# Patient Record
Sex: FEMALE | Race: WHITE | NOT HISPANIC OR LATINO | Employment: UNEMPLOYED | URBAN - NONMETROPOLITAN AREA
[De-identification: names, ages, dates, MRNs, and addresses within clinical notes are randomized per-mention and may not be internally consistent; named-entity substitution may affect disease eponyms.]

---

## 2022-09-16 ENCOUNTER — INITIAL PRENATAL (OUTPATIENT)
Dept: OBSTETRICS AND GYNECOLOGY | Facility: CLINIC | Age: 22
End: 2022-09-16

## 2022-09-16 VITALS — SYSTOLIC BLOOD PRESSURE: 102 MMHG | BODY MASS INDEX: 29.13 KG/M2 | DIASTOLIC BLOOD PRESSURE: 60 MMHG | WEIGHT: 186 LBS

## 2022-09-16 VITALS
DIASTOLIC BLOOD PRESSURE: 60 MMHG | WEIGHT: 186.6 LBS | SYSTOLIC BLOOD PRESSURE: 102 MMHG | BODY MASS INDEX: 29.29 KG/M2 | HEIGHT: 67 IN

## 2022-09-16 DIAGNOSIS — Z34.02 PRIMIGRAVIDA IN SECOND TRIMESTER: ICD-10-CM

## 2022-09-16 DIAGNOSIS — O35.9XX0 SUSPECTED FETAL ANOMALY, ANTEPARTUM, SINGLE OR UNSPECIFIED FETUS: ICD-10-CM

## 2022-09-16 DIAGNOSIS — Z34.02 ENCOUNTER FOR SUPERVISION OF NORMAL FIRST PREGNANCY IN SECOND TRIMESTER: Primary | ICD-10-CM

## 2022-09-16 DIAGNOSIS — N89.8 VAGINAL DISCHARGE DURING PREGNANCY IN SECOND TRIMESTER: ICD-10-CM

## 2022-09-16 DIAGNOSIS — Z36.9 ANTENATAL SCREENING ENCOUNTER: ICD-10-CM

## 2022-09-16 DIAGNOSIS — Z3A.23 23 WEEKS GESTATION OF PREGNANCY: ICD-10-CM

## 2022-09-16 DIAGNOSIS — Z36.3 ENCOUNTER FOR ANTENATAL SCREENING FOR MALFORMATION USING ULTRASOUND: Primary | ICD-10-CM

## 2022-09-16 DIAGNOSIS — Z36.2 ENCOUNTER FOR OTHER ANTENATAL SCREENING FOLLOW-UP: ICD-10-CM

## 2022-09-16 DIAGNOSIS — O26.892 VAGINAL DISCHARGE DURING PREGNANCY IN SECOND TRIMESTER: ICD-10-CM

## 2022-09-16 DIAGNOSIS — F12.91 HISTORY OF MARIJUANA USE: ICD-10-CM

## 2022-09-16 LAB
AMPHET+METHAMPHET UR QL: NEGATIVE
AMPHETAMINES UR QL: NEGATIVE
BARBITURATES UR QL SCN: NEGATIVE
BENZODIAZ UR QL SCN: NEGATIVE
BUPRENORPHINE SERPL-MCNC: NEGATIVE NG/ML
CANDIDA ALBICANS: POSITIVE
CANNABINOIDS SERPL QL: NEGATIVE
COCAINE UR QL: NEGATIVE
GARDNERELLA VAGINALIS: NEGATIVE
METHADONE UR QL SCN: NEGATIVE
OPIATES UR QL: NEGATIVE
OXYCODONE UR QL SCN: NEGATIVE
PCP UR QL SCN: NEGATIVE
PROPOXYPH UR QL: NEGATIVE
T VAGINALIS DNA VAG QL PROBE+SIG AMP: NEGATIVE
TRICYCLICS UR QL SCN: NEGATIVE

## 2022-09-16 PROCEDURE — 87480 CANDIDA DNA DIR PROBE: CPT | Performed by: NURSE PRACTITIONER

## 2022-09-16 PROCEDURE — 99204 OFFICE O/P NEW MOD 45 MIN: CPT | Performed by: NURSE PRACTITIONER

## 2022-09-16 PROCEDURE — 87510 GARDNER VAG DNA DIR PROBE: CPT | Performed by: NURSE PRACTITIONER

## 2022-09-16 PROCEDURE — 87660 TRICHOMONAS VAGIN DIR PROBE: CPT | Performed by: NURSE PRACTITIONER

## 2022-09-16 PROCEDURE — 80306 DRUG TEST PRSMV INSTRMNT: CPT | Performed by: NURSE PRACTITIONER

## 2022-09-16 NOTE — PROGRESS NOTES
I spent approximately 60 minutes with the patient acquiring the health and history intake, reviewing prior records, discussing topics related to healthy pregnancy, entering orders, and documentation. She is transferring from [facility]. We have her records from that office. She has had her ultrasounds and labs done. Her EDC is 1/10/23. This is her 1st pregnancy and she is having a girl.   The patient tells me that she has a lot of headaches and has history of migraines. Her previous OB provider tried her on a couple of different medicines, but they did not help. She complains today about a vaginal discharge; a vaginosis panel will be ordered.   A newob bag is given. We discussed a healthy diet and exercise and what is recommended. I told her to try to eat small frequent healthy meals and stay well hydrated.  I also discussed Listeriosis and Toxoplasmosis and what fish to avoid due to high mercury levels. I informed patient not to be in hot tubs, saunas, or tanning beds. We discussed that spotting may occur after intercourse which is common, but if heavy bleeding like a period occurs to call the [facility] or hospital if clinic is closed.  I encouraged her to make an appointment with the dentist if she has not had a dental exam and cleaning in the last 6 months.  I instructed the patient that alcohol, illicit drug use, and tobacco smoking are not recommended in pregnancy. She is trying to quit vaping. She says she stopped smoking marijuana, but she has used CBD oil a couple of times because it helps with her headaches. I told her that we do not recommend that in pregnancy. I also encouraged her not to be around any secondhand smoke either.  She plans to breastfeed.  She filled out the depression screening questionnaire and scored 6. I talked to her about education classes. I told her that we are doing in-person classes now. I gave her a schedule of the upcoming classes. I encouraged the patient to get the TDAP  vaccine in the 3rd trimester.  I discussed with the patient that a pediatrician needs to be chosen prior to delivery for the infant to have an appointment scheduled before leaving the hospital. I discussed with the patient that she will have a 1 hr glucola around 26-28 weeks gestation. A UDS is done today.  All questions were answered at this time. She is scheduled to see Marlen CUELLAR.

## 2022-09-16 NOTE — PROGRESS NOTES
[facility]  Obstetrics  Date of Service: 2022    CHIEF COMPLAINT:  New prenatal visit    HISTORY OF PRESENT ILLNESS:  Teresa Catalan is a 22 y.o. y/o  at 23w3d by LMP (Patient's last menstrual period was 2022.).  This was an unplanned pregnancy and the patient is supported by her boyfriend.  Reports no nausea without vomiting.  Reports breast tenderness.  She denies any vaginal bleeding.  She has started taking a prenatal vitamin.    REVIEW OF SYSTEMS  Review of Systems   Constitutional: Negative for activity change, appetite change, diaphoresis, fatigue, unexpected weight gain and unexpected weight loss.   Respiratory: Negative for chest tightness and shortness of breath.    Cardiovascular: Negative for chest pain and palpitations.   Gastrointestinal: Negative for abdominal distention, abdominal pain, constipation, diarrhea, nausea and vomiting.   Genitourinary: Negative for breast discharge, breast lump, breast pain, decreased libido, dyspareunia, dysuria, pelvic pain, vaginal bleeding, vaginal discharge and vaginal pain.   Musculoskeletal: Negative for myalgias.   Skin: Negative for color change, dry skin and skin lesions.   Neurological: Negative for light-headedness and headache.   Psychiatric/Behavioral: Negative for agitation, dysphoric mood, sleep disturbance, depressed mood and stress. The patient is not nervous/anxious.        PRENATAL RISK FACTORS   Problems (from 22 to present)     Problem Noted Resolved    Primigravida in second trimester 2022 by Marlen Benedict APRN No          DATING CRITERIA:  LMP (22) -- BELINDA 23  1TUS (22 at 12w0d) -- BELINDA 1/10/23 FINAL    OBSTETRIC HISTORY:  OB History    Para Term  AB Living   1             SAB IAB Ectopic Molar Multiple Live Births                    # Outcome Date GA Lbr Eleazar/2nd Weight Sex Delivery Anes PTL Lv   1 Current              GYN HISTORY:  Denies h/o sexually  transmitted infections/pelvic inflammatory disease  Denies h/o abnormal pap smears  Last pap smear:   Last Completed Pap Smear     This patient has no relevant Health Maintenance data.        Denies h/o gynecologic surgeries, including biopsies of the cervix    PAST MEDICAL HISTORY:  Past Medical History:   Diagnosis Date   • Anxiety    • Chlamydia    • Depression    • GERD (gastroesophageal reflux disease)    • Hemorrhoids    • Migraine    • Substance abuse (HCC)      PAST SURGICAL HISTORY:  Past Surgical History:   Procedure Laterality Date   • EAR TUBES     • SHOULDER SURGERY     • WISDOM TOOTH EXTRACTION       FAMILY HISTORY:  Family History   Problem Relation Age of Onset   • Migraines Mother    • Hypertension Father    • No Known Problems Brother    • COPD Maternal Grandmother    • Skin cancer Maternal Grandfather    • Stomach cancer Paternal Grandmother    • Heart disease Paternal Grandfather      SOCIAL HISTORY:  Social History     Socioeconomic History   • Marital status: Single   Tobacco Use   • Smoking status: Never Smoker   • Smokeless tobacco: Never Used   Vaping Use   • Vaping Use: Every day   Substance and Sexual Activity   • Alcohol use: Never   • Drug use: Not Currently     Types: Marijuana   • Sexual activity: Yes     Partners: Male     GENETIC SCREENING:  Age >36 yo as of BELINDA: no  Thalassemia: no  NTD: no  CHD: no  Down Syndrome/MR/Fragile X/Autism: no  Ashkenazi Mandaeism with Narendra-Sachs, [place]avan, familial dysautonomia: no  Sickle cell disease or trait: no  Hemophilia: no  Muscular dystrophy: no  Cystic fibrosis: no  Bettye's chorea: no  Birth defects: no  Genetic/chromosomal disorders: no    INFECTION HISTORY:  TB exposure: no  HSV: no  Illness since LMP: no  Prior GBS infected child: no  STIs: chlamydia in first trimester, ÓSCAR negative on 8/9    ALLERGIES:  No Known Allergies    MEDICATIONS:  Prior to Admission medications    Medication Sig Start Date End Date Taking? Authorizing Provider    PRENATAL GUMMY VITAMIN Chew 1 each Daily.   Yes Provider, MD Arpit       PHYSICAL EXAM:   /60   Wt 84.4 kg (186 lb)   LMP 2022   BMI 29.13 kg/m²   General: Alert, healthy, no distress, well nourished and well developed.  Neurologic: Alert, oriented to person, place, and time.  Gait normal.  Cranial nerves II-XII grossly intact.  HEENT: Normocephalic, atraumatic.  Extraocular muscles intact, pupils equal and reactive x2.    Teeth: Normal hygiene.  Neck: Supple, no adenopathy, thyroid normal size, non-tender, without nodularity, trachea midline.  Deferred  Lungs: Normal respiratory effort.  Clear to auscultation bilaterally.  No wheezes, rhonci, or rales.  Heart: Regular rate and rhythm.  No murmer, rub or gallop.  Abdomen: Gravid, 23cm.   Skin: No rash, no lesions.  Extremities: No cyanosis, clubbing or edema.  PELVIC EXAM: Deferred    IMPRESSION:  Teresa Catalan is a 22 y.o.  at 23w3d for a new prenatal visit.    PLAN:  1.  IUP at 23w3d  - Options counseling performed and patient desires continuation of pregnancy to term   - Prenatal labs ordered  - Genetic testing, including cystic fibrosis, was discussed and patient had drawn; Panorama low risk, having a Girl  - Continue prenatal vitamins  - Weight gain counseling performed.   - Pregravid BMI 25-29.9: Recommend 15-25 lb  - Return to clinic in 4 weeks for return prenatal visit  - Reviewed medical records from [facility]. Anatomy scan at 2 weeks had subopts of cervical spine. Will repeat to R/O fetal anomaly of that area. Scheduled with KAILA Renee     Diagnosis Plan   1. Encounter for  screening for malformation using ultrasound  US Ob 14 + Weeks Single or First Gestation   2. Encounter for other  screening follow-up  US Ob 14 + Weeks Single or First Gestation   3. Suspected fetal anomaly, antepartum, single or unspecified fetus  US Ob 14 + Weeks Single or First Gestation   4. Primigravida in second trimester     5. 23  weeks gestation of pregnancy       Marlen Benedict, APRN  9/16/2022  10:29 CDT

## 2022-09-16 NOTE — PROGRESS NOTES
CC: Prenatal visit    Teresa Catalan is a 22 y.o.  at 23w3d.  Doing well.  Denies contractions, LOF, or VB.  Reports good FM.    LMP 2022   SVE: ***           Problems (from 22 to present)     No problems associated with this episode.          A/P: Teresa Catalan is a 22 y.o.  at 23w3d.  - RTC in *** weeks  - Reviewed COVID-19 visitation policy  - Reviewed COVID-19 precautions     Diagnosis Plan   1. Encounter for  screening for malformation using ultrasound  US Ob 14 + Weeks Single or First Gestation   2. Encounter for other  screening follow-up  US Ob 14 + Weeks Single or First Gestation   3. Suspected fetal anomaly, antepartum, single or unspecified fetus  US Ob 14 + Weeks Single or First Gestation     Marlen Benedict, POLO  2022  10:26 CDT

## 2022-09-26 ENCOUNTER — REFERRAL TRIAGE (OUTPATIENT)
Dept: LABOR AND DELIVERY | Facility: HOSPITAL | Age: 22
End: 2022-09-26

## 2022-09-27 ENCOUNTER — PATIENT OUTREACH (OUTPATIENT)
Dept: LABOR AND DELIVERY | Facility: HOSPITAL | Age: 22
End: 2022-09-27

## 2022-09-27 NOTE — OUTREACH NOTE
[facility]  Enrollment    Current Estimated Gestational Age: 25w0d    Questions/Answers    Flowsheet Row Responses   Would like to participate? Yes   Date of Intake Visit 10/12/22              Ree Gamble RN  Maternity Nurse Navigator    9/27/2022, 14:10 CDT

## 2022-10-12 ENCOUNTER — ROUTINE PRENATAL (OUTPATIENT)
Dept: OBSTETRICS AND GYNECOLOGY | Facility: CLINIC | Age: 22
End: 2022-10-12

## 2022-10-12 ENCOUNTER — LAB (OUTPATIENT)
Dept: LAB | Facility: HOSPITAL | Age: 22
End: 2022-10-12

## 2022-10-12 ENCOUNTER — PATIENT OUTREACH (OUTPATIENT)
Dept: LABOR AND DELIVERY | Facility: HOSPITAL | Age: 22
End: 2022-10-12

## 2022-10-12 ENCOUNTER — TELEPHONE (OUTPATIENT)
Dept: OBSTETRICS AND GYNECOLOGY | Facility: CLINIC | Age: 22
End: 2022-10-12

## 2022-10-12 VITALS — SYSTOLIC BLOOD PRESSURE: 106 MMHG | WEIGHT: 197 LBS | BODY MASS INDEX: 30.85 KG/M2 | DIASTOLIC BLOOD PRESSURE: 62 MMHG

## 2022-10-12 DIAGNOSIS — K21.9 GASTROESOPHAGEAL REFLUX DURING PREGNANCY IN SECOND TRIMESTER, ANTEPARTUM: ICD-10-CM

## 2022-10-12 DIAGNOSIS — Z34.02 PRIMIGRAVIDA IN SECOND TRIMESTER: Primary | ICD-10-CM

## 2022-10-12 DIAGNOSIS — Z36.9 ANTENATAL SCREENING ENCOUNTER: ICD-10-CM

## 2022-10-12 DIAGNOSIS — O99.612 GASTROESOPHAGEAL REFLUX DURING PREGNANCY IN SECOND TRIMESTER, ANTEPARTUM: ICD-10-CM

## 2022-10-12 DIAGNOSIS — Z23 NEED FOR TDAP VACCINATION: ICD-10-CM

## 2022-10-12 DIAGNOSIS — Z3A.27 27 WEEKS GESTATION OF PREGNANCY: ICD-10-CM

## 2022-10-12 DIAGNOSIS — L29.2 VULVAR ITCHING: ICD-10-CM

## 2022-10-12 LAB
CANDIDA ALBICANS: NEGATIVE
DEPRECATED RDW RBC AUTO: 40.5 FL (ref 37–54)
ERYTHROCYTE [DISTWIDTH] IN BLOOD BY AUTOMATED COUNT: 12.4 % (ref 12.3–15.4)
GARDNERELLA VAGINALIS: POSITIVE
GLUCOSE 1H P 100 G GLC PO SERPL-MCNC: 115 MG/DL (ref 65–139)
HCT VFR BLD AUTO: 32.2 % (ref 34–46.6)
HGB BLD-MCNC: 10.8 G/DL (ref 12–15.9)
MCH RBC QN AUTO: 30.9 PG (ref 26.6–33)
MCHC RBC AUTO-ENTMCNC: 33.5 G/DL (ref 31.5–35.7)
MCV RBC AUTO: 92 FL (ref 79–97)
PLATELET # BLD AUTO: 330 10*3/MM3 (ref 140–450)
PMV BLD AUTO: 9.5 FL (ref 6–12)
RBC # BLD AUTO: 3.5 10*6/MM3 (ref 3.77–5.28)
T VAGINALIS DNA VAG QL PROBE+SIG AMP: NEGATIVE
WBC NRBC COR # BLD: 9.33 10*3/MM3 (ref 3.4–10.8)

## 2022-10-12 PROCEDURE — 90471 IMMUNIZATION ADMIN: CPT | Performed by: NURSE PRACTITIONER

## 2022-10-12 PROCEDURE — 87480 CANDIDA DNA DIR PROBE: CPT | Performed by: NURSE PRACTITIONER

## 2022-10-12 PROCEDURE — 90715 TDAP VACCINE 7 YRS/> IM: CPT | Performed by: NURSE PRACTITIONER

## 2022-10-12 PROCEDURE — 87660 TRICHOMONAS VAGIN DIR PROBE: CPT | Performed by: NURSE PRACTITIONER

## 2022-10-12 PROCEDURE — 87510 GARDNER VAG DNA DIR PROBE: CPT | Performed by: NURSE PRACTITIONER

## 2022-10-12 PROCEDURE — 82950 GLUCOSE TEST: CPT

## 2022-10-12 PROCEDURE — 0502F SUBSEQUENT PRENATAL CARE: CPT | Performed by: NURSE PRACTITIONER

## 2022-10-12 PROCEDURE — 85027 COMPLETE CBC AUTOMATED: CPT

## 2022-10-12 RX ORDER — OMEPRAZOLE 40 MG/1
40 CAPSULE, DELAYED RELEASE ORAL DAILY
Qty: 30 CAPSULE | Refills: 6 | Status: ON HOLD | OUTPATIENT
Start: 2022-10-12 | End: 2022-12-17

## 2022-10-12 NOTE — TELEPHONE ENCOUNTER
After visit today patient called back with questions. Patient states after having her ultrasound today, she noticed her LMP was wrong. Patient transferred care from [facility]. I told patient I would check those records and give her a call. All records from [facility] state LMP was 4/30/22. Patient says her LMP was 3/30/22. Not sure if there was a typo. Per Marlen Benedict, patients LMP was updated in the computer to reflect the correct LMP. I called the patient back and made her aware.

## 2022-10-12 NOTE — OUTREACH NOTE
Spoke to Teresa and she is at her doctor's office finishing up an appointment.  Will call her back around 1100 to complete her intake call.

## 2022-10-12 NOTE — PROGRESS NOTES
CC: Prenatal visit    Teresa Catalan is a 22 y.o.  at 27w1d.  Doing well.  Denies dysuria, headaches, constipation, cramping, regular   contractions, LOF, or VB.  Reports good FM. Has vulvar itching but denies vaginal d/c. Has c/o reflux and Tums is not enough.    /62   Wt 89.4 kg (197 lb)   LMP 2022   BMI 30.85 kg/m²   SVE: NA  Pt self-collected a vag panel     Fetal Heart Rate: 139     Problems (from 22 to present)     Problem Noted Resolved    Gastroesophageal reflux during pregnancy in second trimester, antepartum 10/12/2022 by Marlen Benedict APRN No    Primigravida in second trimester 2022 by Marlen Benedict APRN No          A/P: Teresa Catalan is a 22 y.o.  at 27w1d.  - RTC in 3 weeks  - Reviewed COVID-19 visitation policy  - Reviewed COVID-19 precautions     Diagnosis Plan   1. Primigravida in second trimester        2. Vulvar itching  Gardnerella vaginalis, Trichomonas vaginalis, Candida albicans, DNA - Swab, Vagina      3. Gastroesophageal reflux during pregnancy in second trimester, antepartum  Start on omeprazole daily. RBA and potential s/e reviewed.       4. Need for Tdap vaccination  Tdap Vaccine Greater Than or Equal To 6yo IM      5. 27 weeks gestation of pregnancy          POLO Purdy  10/12/2022  11:46 CDT

## 2022-10-12 NOTE — OUTREACH NOTE
[facility]  Intake    Current Estimated Gestational Age: 27w1d    Questions/Answers    Flowsheet Row Responses   Best Method for Contacting Cell   Do you have a dentist? Yes   Dentist Name Dr. Velásquez   Have you seen a dentist in the last 6 months Yes   Next Appointment: --  [November]   Resources Presently Utilizing: WIC (Women, Infant, Children)   Maternal Warning Signs Provided          [facility]  Check-In    Current Estimated Gestational Age: 27w1d    Questions/Answers    Flowsheet Row Responses   Best Method for Contacting Cell   Demographics Reviewed Yes   Currently Employed Yes   Able to keep appointments as scheduled Yes   Gender(s) and Name(s) Girl- Juliano Manning   Baby Active/Feeling Fetal Movemen Yes   How are you presently feeling? doing well   Questions regarding prenatal visits or tests to be ordered? No   Education related to new diagnoses/home equipment No   May I ask you questions about your substance use? Yes   Resource/Environmental Concerns None   Do you have any questions related to your care experience, your pregnancy, plans for delivery, any concerns, etc? No          Patient is interested in a unit tour.  She will check with s/o about availability and we will schedule this at our next check in.    Ree Gamble, RN  Maternity Nurse Navigator    10/12/2022, 11:35 CDT          Ree Gamble RN  Maternity Nurse Navigator    10/12/2022, 11:35 CDT

## 2022-10-13 DIAGNOSIS — Z36.3 ENCOUNTER FOR ANTENATAL SCREENING FOR MALFORMATION USING ULTRASOUND: ICD-10-CM

## 2022-10-13 DIAGNOSIS — Z36.2 ENCOUNTER FOR OTHER ANTENATAL SCREENING FOLLOW-UP: ICD-10-CM

## 2022-10-13 DIAGNOSIS — O35.9XX0 SUSPECTED FETAL ANOMALY, ANTEPARTUM, SINGLE OR UNSPECIFIED FETUS: ICD-10-CM

## 2022-10-13 RX ORDER — METRONIDAZOLE 500 MG/1
500 TABLET ORAL 2 TIMES DAILY
Qty: 14 TABLET | Refills: 0 | Status: SHIPPED | OUTPATIENT
Start: 2022-10-13 | End: 2022-10-20

## 2022-11-02 ENCOUNTER — ROUTINE PRENATAL (OUTPATIENT)
Dept: OBSTETRICS AND GYNECOLOGY | Facility: CLINIC | Age: 22
End: 2022-11-02

## 2022-11-02 VITALS — WEIGHT: 204 LBS | DIASTOLIC BLOOD PRESSURE: 70 MMHG | BODY MASS INDEX: 31.95 KG/M2 | SYSTOLIC BLOOD PRESSURE: 108 MMHG

## 2022-11-02 DIAGNOSIS — Z34.03 PRIMIGRAVIDA IN THIRD TRIMESTER: Primary | ICD-10-CM

## 2022-11-02 DIAGNOSIS — Z3A.31 31 WEEKS GESTATION OF PREGNANCY: ICD-10-CM

## 2022-11-02 DIAGNOSIS — O99.612 GASTROESOPHAGEAL REFLUX DURING PREGNANCY IN SECOND TRIMESTER, ANTEPARTUM: ICD-10-CM

## 2022-11-02 DIAGNOSIS — M54.9 BACK PAIN AFFECTING PREGNANCY IN THIRD TRIMESTER: ICD-10-CM

## 2022-11-02 DIAGNOSIS — O99.891 BACK PAIN AFFECTING PREGNANCY IN THIRD TRIMESTER: ICD-10-CM

## 2022-11-02 DIAGNOSIS — K21.9 GASTROESOPHAGEAL REFLUX DURING PREGNANCY IN SECOND TRIMESTER, ANTEPARTUM: ICD-10-CM

## 2022-11-02 PROCEDURE — 0502F SUBSEQUENT PRENATAL CARE: CPT | Performed by: NURSE PRACTITIONER

## 2022-11-02 NOTE — PROGRESS NOTES
CC: Prenatal visit    Teresa Catalan is a 22 y.o.  at 31w0d.  Doing well.  Denies N/V, dysuria, abnormal vaginal d/c, headaches, heartburn, constipation, cramping, regular contractions, LOF, or VB.  Reports good FM. Having frequent low back pain. Has not tried anything OTC or comfort measures.    /70   Wt 92.5 kg (204 lb)   LMP 2022 (Exact Date)   BMI 31.95 kg/m²   SVE: NA  Fundal Height (cm): 31 cm  Fetal Heart Rate: 136     Problems (from 22 to present)     Problem Noted Resolved    Gastroesophageal reflux during pregnancy in second trimester, antepartum 10/12/2022 by Marlen Benedict APRN No    Primigravida in third trimester 2022 by Marlen Benedict APRN No          A/P: Teresa Catalan is a 22 y.o.  at 31w0d.  - RTC in 2 weeks  - Reviewed COVID-19 visitation policy  - Reviewed COVID-19 precautions     Diagnosis Plan   1. Primigravida in third trimester        2. Gastroesophageal reflux during pregnancy in second trimester, antepartum        3. 31 weeks gestation of pregnancy        4. Back pain affecting pregnancy in third trimester  Ambulatory Referral to Physical Therapy Evaluate and treat, Pelvic Floor    Comfort measures reviewed.         POLO Purdy  2022  12:54 CDT

## 2022-11-04 PROBLEM — Z34.03 PRIMIGRAVIDA IN THIRD TRIMESTER: Status: ACTIVE | Noted: 2022-09-16

## 2022-11-08 ENCOUNTER — PATIENT OUTREACH (OUTPATIENT)
Dept: LABOR AND DELIVERY | Facility: HOSPITAL | Age: 22
End: 2022-11-08

## 2022-11-08 NOTE — OUTREACH NOTE
[facility]  Unable to Reach       Questions/Answers    Flowsheet Row Responses   Pending Outreach Prenatal Check-in   Call Attempt First   Outcome No answer/busy, MyChart message sent to patient   Unable to reach comments: will contact again at 35-36 weeks              Ree Gamble RN  Maternity Nurse Navigator    11/8/2022, 14:18 CST

## 2022-11-21 ENCOUNTER — ROUTINE PRENATAL (OUTPATIENT)
Dept: OBSTETRICS AND GYNECOLOGY | Facility: CLINIC | Age: 22
End: 2022-11-21

## 2022-11-21 VITALS — WEIGHT: 214 LBS | BODY MASS INDEX: 33.52 KG/M2 | DIASTOLIC BLOOD PRESSURE: 78 MMHG | SYSTOLIC BLOOD PRESSURE: 118 MMHG

## 2022-11-21 DIAGNOSIS — Z34.03 PRIMIGRAVIDA IN THIRD TRIMESTER: Primary | ICD-10-CM

## 2022-11-21 DIAGNOSIS — K21.9 GASTROESOPHAGEAL REFLUX DURING PREGNANCY IN SECOND TRIMESTER, ANTEPARTUM: ICD-10-CM

## 2022-11-21 DIAGNOSIS — Z3A.33 33 WEEKS GESTATION OF PREGNANCY: ICD-10-CM

## 2022-11-21 DIAGNOSIS — O99.612 GASTROESOPHAGEAL REFLUX DURING PREGNANCY IN SECOND TRIMESTER, ANTEPARTUM: ICD-10-CM

## 2022-11-21 PROCEDURE — 0502F SUBSEQUENT PRENATAL CARE: CPT | Performed by: NURSE PRACTITIONER

## 2022-12-01 ENCOUNTER — ROUTINE PRENATAL (OUTPATIENT)
Dept: OBSTETRICS AND GYNECOLOGY | Facility: CLINIC | Age: 22
End: 2022-12-01

## 2022-12-01 VITALS — BODY MASS INDEX: 34.21 KG/M2 | SYSTOLIC BLOOD PRESSURE: 112 MMHG | WEIGHT: 218.4 LBS | DIASTOLIC BLOOD PRESSURE: 72 MMHG

## 2022-12-01 DIAGNOSIS — O99.612 GASTROESOPHAGEAL REFLUX DURING PREGNANCY IN SECOND TRIMESTER, ANTEPARTUM: ICD-10-CM

## 2022-12-01 DIAGNOSIS — Z3A.35 35 WEEKS GESTATION OF PREGNANCY: ICD-10-CM

## 2022-12-01 DIAGNOSIS — K21.9 GASTROESOPHAGEAL REFLUX DURING PREGNANCY IN SECOND TRIMESTER, ANTEPARTUM: ICD-10-CM

## 2022-12-01 DIAGNOSIS — Z34.03 PRIMIGRAVIDA IN THIRD TRIMESTER: Primary | ICD-10-CM

## 2022-12-01 PROCEDURE — 59425 ANTEPARTUM CARE ONLY: CPT | Performed by: OBSTETRICS & GYNECOLOGY

## 2022-12-01 NOTE — PROGRESS NOTES
CC: Prenatal visit    Teresa Catalan is a 22 y.o.  at 35w1d.  Doing well.  Denies contractions, LOF, or VB.  Reports good FM.    /72   Wt 99.1 kg (218 lb 6.4 oz)   LMP 2022 (Exact Date)   BMI 34.21 kg/m²    BSUS: cephalic     Fetal Heart Rate: 148     Problems (from 22 to present)     Problem Noted Resolved    Gastroesophageal reflux during pregnancy in second trimester, antepartum 10/12/2022 by Marlen Benedict APRN No    Primigravida in third trimester 2022 by Marlen Benedict, POLO No        A/P: Teresa Catalan is a 22 y.o.  at 35w1d.  - RTC in 1 week w/ GBS  - Hopes for spontaneous labor, considering an epidural  - Reviewed COVID-19 visitation policy  - Reviewed COVID-19 precautions     Diagnosis Plan   1. Primigravida in third trimester        2. Gastroesophageal reflux during pregnancy in second trimester, antepartum        3. 35 weeks gestation of pregnancy          Ramonita Delong MD  2022  15:13 CST

## 2022-12-08 ENCOUNTER — ROUTINE PRENATAL (OUTPATIENT)
Dept: OBSTETRICS AND GYNECOLOGY | Facility: CLINIC | Age: 22
End: 2022-12-08

## 2022-12-08 VITALS — DIASTOLIC BLOOD PRESSURE: 70 MMHG | SYSTOLIC BLOOD PRESSURE: 116 MMHG | WEIGHT: 218 LBS | BODY MASS INDEX: 34.14 KG/M2

## 2022-12-08 DIAGNOSIS — O98.819 CHLAMYDIA INFECTION DURING PREGNANCY: ICD-10-CM

## 2022-12-08 DIAGNOSIS — Z36.89 ENCOUNTER FOR OTHER SPECIFIED ANTENATAL SCREENING: ICD-10-CM

## 2022-12-08 DIAGNOSIS — K21.9 GASTROESOPHAGEAL REFLUX DURING PREGNANCY IN SECOND TRIMESTER, ANTEPARTUM: ICD-10-CM

## 2022-12-08 DIAGNOSIS — Z3A.36 36 WEEKS GESTATION OF PREGNANCY: Primary | ICD-10-CM

## 2022-12-08 DIAGNOSIS — A74.9 CHLAMYDIA INFECTION DURING PREGNANCY: ICD-10-CM

## 2022-12-08 DIAGNOSIS — Z34.03 PRIMIGRAVIDA IN THIRD TRIMESTER: ICD-10-CM

## 2022-12-08 DIAGNOSIS — O99.612 GASTROESOPHAGEAL REFLUX DURING PREGNANCY IN SECOND TRIMESTER, ANTEPARTUM: ICD-10-CM

## 2022-12-08 PROCEDURE — 0502F SUBSEQUENT PRENATAL CARE: CPT | Performed by: NURSE PRACTITIONER

## 2022-12-08 PROCEDURE — 87653 STREP B DNA AMP PROBE: CPT | Performed by: NURSE PRACTITIONER

## 2022-12-08 NOTE — PROGRESS NOTES
CC: Prenatal visit    Teresa Catalan is a 22 y.o.  at 36w1d.  Doing well.  Denies N/V, dysuria, abnormal vaginal d/c, headaches, heartburn, constipation, cramping, regular contractions, LOF, or VB.  Reports good FM.     /70   Wt 98.9 kg (218 lb)   LMP 2022 (Exact Date)   BMI 34.14 kg/m²   SVE:2/50/-3  Fundal Height (cm): 36 cm  Fetal Heart Rate: 149   Vertex via Vscan     Problems (from 22 to present)     Problem Noted Resolved    Chlamydia infection during pregnancy 2022 by Harini Castro APRN No    Overview Signed 2022  2:28 PM by Harini Castro APRN     ÓSCAR normal on 2022-in scanned records         Gastroesophageal reflux during pregnancy in second trimester, antepartum 10/12/2022 by Marlen Benedict APRN No    Overview Signed 2022 11:37 AM by Marlen Benedict APRN     omeprazole         Primigravida in third trimester 2022 by Marlen Benedict APRN No          A/P: Teresa Catalan is a 22 y.o.  at 36w1d.  - GBSs collected  - Labor and fetal kick count precautions  - RTC in 1  weeks  - Reviewed COVID-19 visitation policy  - Reviewed COVID-19 precautions     Diagnosis Plan   1. 36 weeks gestation of pregnancy        2. Primigravida in third trimester        3. Gastroesophageal reflux during pregnancy in second trimester, antepartum        4. Chlamydia infection during pregnancy  Chlamydia trachomatis, Neisseria gonorrhoeae, Trichomonas vaginalis, PCR - Urine, Cervix      5. Encounter for other specified  screening  Group B Strep (Molecular) - Swab, Vaginal/Rectum        POLO Jones  2022  14:28 CST

## 2022-12-09 ENCOUNTER — HOSPITAL ENCOUNTER (OUTPATIENT)
Facility: HOSPITAL | Age: 22
Discharge: HOME | End: 2022-12-09
Attending: STUDENT IN AN ORGANIZED HEALTH CARE EDUCATION/TRAINING PROGRAM | Admitting: STUDENT IN AN ORGANIZED HEALTH CARE EDUCATION/TRAINING PROGRAM

## 2022-12-09 VITALS
HEART RATE: 103 BPM | OXYGEN SATURATION: 94 % | DIASTOLIC BLOOD PRESSURE: 68 MMHG | RESPIRATION RATE: 16 BRPM | TEMPERATURE: 98 F | SYSTOLIC BLOOD PRESSURE: 116 MMHG

## 2022-12-09 LAB
BACTERIA UR QL AUTO: ABNORMAL /HPF
BILIRUB UR QL STRIP: NEGATIVE
CANDIDA ALBICANS: NEGATIVE
CLARITY UR: CLEAR
COLOR UR: YELLOW
GARDNERELLA VAGINALIS: NEGATIVE
GLUCOSE UR STRIP-MCNC: NEGATIVE MG/DL
HGB UR QL STRIP.AUTO: NEGATIVE
HYALINE CASTS UR QL AUTO: ABNORMAL /LPF
KETONES UR QL STRIP: ABNORMAL
LEUKOCYTE ESTERASE UR QL STRIP.AUTO: ABNORMAL
NITRITE UR QL STRIP: NEGATIVE
PH UR STRIP.AUTO: 6 [PH] (ref 5–9)
PROT UR QL STRIP: NEGATIVE
RBC # UR STRIP: ABNORMAL /HPF
REF LAB TEST METHOD: ABNORMAL
SP GR UR STRIP: 1.01 (ref 1–1.03)
SQUAMOUS #/AREA URNS HPF: ABNORMAL /HPF
T VAGINALIS DNA VAG QL PROBE+SIG AMP: NEGATIVE
UROBILINOGEN UR QL STRIP: ABNORMAL
WBC # UR STRIP: ABNORMAL /HPF

## 2022-12-09 PROCEDURE — G0463 HOSPITAL OUTPT CLINIC VISIT: HCPCS

## 2022-12-09 PROCEDURE — 87086 URINE CULTURE/COLONY COUNT: CPT | Performed by: OBSTETRICS & GYNECOLOGY

## 2022-12-09 PROCEDURE — 59025 FETAL NON-STRESS TEST: CPT

## 2022-12-09 PROCEDURE — 87480 CANDIDA DNA DIR PROBE: CPT | Performed by: OBSTETRICS & GYNECOLOGY

## 2022-12-09 PROCEDURE — 96374 THER/PROPH/DIAG INJ IV PUSH: CPT

## 2022-12-09 PROCEDURE — 87660 TRICHOMONAS VAGIN DIR PROBE: CPT | Performed by: OBSTETRICS & GYNECOLOGY

## 2022-12-09 PROCEDURE — 87510 GARDNER VAG DNA DIR PROBE: CPT | Performed by: OBSTETRICS & GYNECOLOGY

## 2022-12-09 PROCEDURE — 81001 URINALYSIS AUTO W/SCOPE: CPT | Performed by: OBSTETRICS & GYNECOLOGY

## 2022-12-09 RX ORDER — PANTOPRAZOLE SODIUM 40 MG/1
40 TABLET, DELAYED RELEASE ORAL ONCE
Status: COMPLETED | OUTPATIENT
Start: 2022-12-09 | End: 2022-12-09

## 2022-12-09 RX ORDER — SODIUM CHLORIDE 0.9 % (FLUSH) 0.9 %
10 SYRINGE (ML) INJECTION EVERY 12 HOURS SCHEDULED
Status: DISCONTINUED | OUTPATIENT
Start: 2022-12-09 | End: 2022-12-10 | Stop reason: HOSPADM

## 2022-12-09 RX ORDER — PANTOPRAZOLE SODIUM 40 MG/10ML
40 INJECTION, POWDER, LYOPHILIZED, FOR SOLUTION INTRAVENOUS ONCE
Status: COMPLETED | OUTPATIENT
Start: 2022-12-09 | End: 2022-12-09

## 2022-12-09 RX ORDER — CALCIUM CARBONATE 200(500)MG
2 TABLET,CHEWABLE ORAL AS NEEDED
COMMUNITY
End: 2023-02-06

## 2022-12-09 RX ORDER — SODIUM CHLORIDE 0.9 % (FLUSH) 0.9 %
10 SYRINGE (ML) INJECTION AS NEEDED
Status: DISCONTINUED | OUTPATIENT
Start: 2022-12-09 | End: 2022-12-10 | Stop reason: HOSPADM

## 2022-12-09 RX ADMIN — SODIUM CHLORIDE, POTASSIUM CHLORIDE, SODIUM LACTATE AND CALCIUM CHLORIDE 1000 ML: 600; 310; 30; 20 INJECTION, SOLUTION INTRAVENOUS at 21:32

## 2022-12-09 RX ADMIN — PANTOPRAZOLE SODIUM 40 MG: 40 INJECTION, POWDER, FOR SOLUTION INTRAVENOUS at 21:38

## 2022-12-10 LAB — GROUP B STREP, DNA: POSITIVE

## 2022-12-10 NOTE — DISCHARGE INSTR - ACTIVITY
Drink plenty of water  Rest as needed  May take tylenol for discomfort as needed  May use heating pad on low setting as needed for discomfort  Keep all scheduled appointments

## 2022-12-10 NOTE — NON STRESS TEST
Teresa Catalan, a  at 36w2d with an BELINDA of 2023, by Last Menstrual Period, was seen at [facility] for a nonstress test.    Chief Complaint   Patient presents with   • Contractions     Cramping since day before yesterday after SVE in office, states dog just got ran over and it could just be stress       Patient Active Problem List   Diagnosis   • Primigravida in third trimester   • Gastroesophageal reflux during pregnancy in second trimester, antepartum   • Chlamydia infection during pregnancy       Start Time:   Stop Time:     Interpretation A  Nonstress Test Interpretation A: Reactive  Comments A: Juan PARISI RNC         pt arrives to unit with complaints of cramping for 2 days. Vag panel negative, U/A 3+ ketones, encouraged PO hydration and pt states she could not drink the water due to heartburn.  IVF bolus 1000ml and protonix 40mg IV given SVE unchanged over one hour, ucs irregular.  Patient may d/c home with encouragement to stay hydrated and talk with provider about omeprazole not helping at home.

## 2022-12-11 LAB — BACTERIA SPEC AEROBE CULT: NO GROWTH

## 2022-12-13 ENCOUNTER — PATIENT OUTREACH (OUTPATIENT)
Dept: OBSTETRICS AND GYNECOLOGY | Facility: HOSPITAL | Age: 22
End: 2022-12-13

## 2022-12-13 NOTE — OUTREACH NOTE
[facility]  Check-In    Current Estimated Gestational Age: 36w6d    Questions/Answers    Flowsheet Row Responses   Best Method for Contacting Cell   Demographics Reviewed Yes   Currently Employed Yes   Able to keep appointments as scheduled Yes   Gender(s) and Name(s) Girl- Donna Manning   Baby Active/Feeling Fetal Movemen Yes   How are you presently feeling? doing ok, ready for her to be here.   Questions regarding prenatal visits or tests to be ordered? No   Education related to new diagnoses/home equipment No   May I ask you questions about your substance use? Yes   Other Comment denies   Supplies ready for baby Breast Pump, Car Seat, Clothing, Crib, Diapers, Feeding Supplies   Resource/Environmental Concerns None   Do you have any questions related to your care experience, your pregnancy, plans for delivery, any concerns, etc? No              Ree Gamble RN  Maternity Nurse Navigator    12/13/2022, 14:26 CST

## 2022-12-14 ENCOUNTER — ROUTINE PRENATAL (OUTPATIENT)
Dept: OBSTETRICS AND GYNECOLOGY | Facility: CLINIC | Age: 22
End: 2022-12-14

## 2022-12-14 VITALS — DIASTOLIC BLOOD PRESSURE: 70 MMHG | SYSTOLIC BLOOD PRESSURE: 112 MMHG | BODY MASS INDEX: 34.93 KG/M2 | WEIGHT: 223 LBS

## 2022-12-14 DIAGNOSIS — O99.612 GASTROESOPHAGEAL REFLUX DURING PREGNANCY IN SECOND TRIMESTER, ANTEPARTUM: Primary | ICD-10-CM

## 2022-12-14 DIAGNOSIS — O98.819 CHLAMYDIA INFECTION DURING PREGNANCY: ICD-10-CM

## 2022-12-14 DIAGNOSIS — Z3A.37 37 WEEKS GESTATION OF PREGNANCY: ICD-10-CM

## 2022-12-14 DIAGNOSIS — A74.9 CHLAMYDIA INFECTION DURING PREGNANCY: ICD-10-CM

## 2022-12-14 DIAGNOSIS — K21.9 GASTROESOPHAGEAL REFLUX DURING PREGNANCY IN SECOND TRIMESTER, ANTEPARTUM: Primary | ICD-10-CM

## 2022-12-14 DIAGNOSIS — O36.8130 DECREASED FETAL MOVEMENTS IN THIRD TRIMESTER, SINGLE OR UNSPECIFIED FETUS: ICD-10-CM

## 2022-12-14 DIAGNOSIS — Z34.03 PRIMIGRAVIDA IN THIRD TRIMESTER: ICD-10-CM

## 2022-12-14 PROCEDURE — 59025 FETAL NON-STRESS TEST: CPT | Performed by: NURSE PRACTITIONER

## 2022-12-14 PROCEDURE — 0502F SUBSEQUENT PRENATAL CARE: CPT | Performed by: NURSE PRACTITIONER

## 2022-12-14 NOTE — PROGRESS NOTES
CC: Prenatal visit    Teresa Catalan is a 22 y.o.  at 37w0d.  Doing well.  Denies N/V, dysuria, abnormal vaginal d/c, headaches, heartburn, constipation, cramping, regular contractions, LOF, or VB.  Reports DECREASED FM.    /70   Wt 101 kg (223 lb)   LMP 2022 (Exact Date)   BMI 34.93 kg/m²   SVE: NA    NST: reactive in under 15 minutes  Fundal Height (cm): 37 cm  Fetal Heart Rate: 138     Problems (from 22 to present)     Problem Noted Resolved    Chlamydia infection during pregnancy 2022 by Harini Castro APRN No    Overview Signed 2022  2:28 PM by Harini Castro APRN     ÓSCAR normal on 2022-in scanned records         Gastroesophageal reflux during pregnancy in second trimester, antepartum 10/12/2022 by Marlen Benedict APRN No    Overview Signed 2022 11:37 AM by Marlen Benedict APRN     omeprazole         Primigravida in third trimester 2022 by Marlen Benedict APRN No          A/P: Teresa Catalan is a 22 y.o.  at 37w0d.  - RTC in 1 week  - Reviewed COVID-19 visitation policy  - Reviewed COVID-19 precautions     Diagnosis Plan   1. Gastroesophageal reflux during pregnancy in second trimester, antepartum        2. Primigravida in third trimester        3. Chlamydia infection during pregnancy  ÓSCAR today      4. Decreased fetal movements in third trimester, single or unspecified fetus  Reactive NST. Pt states that she is up on her feet a lot most of the day. Advised to rest periodically and monitor for baby's movements. [facility] monitoring removed.       5. 37 weeks gestation of pregnancy          POLO Purdy  2022  11:39 CST

## 2022-12-17 ENCOUNTER — HOSPITAL ENCOUNTER (INPATIENT)
Facility: HOSPITAL | Age: 22
LOS: 2 days | Discharge: HOME | End: 2022-12-19
Attending: OBSTETRICS & GYNECOLOGY | Admitting: OBSTETRICS & GYNECOLOGY

## 2022-12-17 ENCOUNTER — ANESTHESIA EVENT (OUTPATIENT)
Dept: LABOR AND DELIVERY | Facility: HOSPITAL | Age: 22
End: 2022-12-17

## 2022-12-17 ENCOUNTER — ANESTHESIA (OUTPATIENT)
Dept: LABOR AND DELIVERY | Facility: HOSPITAL | Age: 22
End: 2022-12-17

## 2022-12-17 PROBLEM — Z37.9 NORMAL LABOR: Status: ACTIVE | Noted: 2022-12-17

## 2022-12-17 PROBLEM — O99.619 GASTROESOPHAGEAL REFLUX DURING PREGNANCY, ANTEPARTUM: Status: ACTIVE | Noted: 2022-10-12

## 2022-12-17 LAB
ABO GROUP BLD: NORMAL
ABO GROUP BLD: NORMAL
AMPHET+METHAMPHET UR QL: NEGATIVE
AMPHETAMINES UR QL: NEGATIVE
BARBITURATES UR QL SCN: NEGATIVE
BENZODIAZ UR QL SCN: NEGATIVE
BLD GP AB SCN SERPL QL: NEGATIVE
BUPRENORPHINE SERPL-MCNC: NEGATIVE NG/ML
CANNABINOIDS SERPL QL: NEGATIVE
COCAINE UR QL: NEGATIVE
DEPRECATED RDW RBC AUTO: 37 FL (ref 37–54)
ERYTHROCYTE [DISTWIDTH] IN BLOOD BY AUTOMATED COUNT: 12.2 % (ref 12.3–15.4)
HCT VFR BLD AUTO: 36.8 % (ref 34–46.6)
HGB BLD-MCNC: 12.2 G/DL (ref 12–15.9)
Lab: NORMAL
MCH RBC QN AUTO: 28.1 PG (ref 26.6–33)
MCHC RBC AUTO-ENTMCNC: 33.2 G/DL (ref 31.5–35.7)
MCV RBC AUTO: 84.8 FL (ref 79–97)
METHADONE UR QL SCN: NEGATIVE
OPIATES UR QL: NEGATIVE
OXYCODONE UR QL SCN: NEGATIVE
PCP UR QL SCN: NEGATIVE
PLATELET # BLD AUTO: 355 10*3/MM3 (ref 140–450)
PMV BLD AUTO: 9.7 FL (ref 6–12)
PROPOXYPH UR QL: NEGATIVE
RBC # BLD AUTO: 4.34 10*6/MM3 (ref 3.77–5.28)
RH BLD: POSITIVE
RH BLD: POSITIVE
T&S EXPIRATION DATE: NORMAL
TRICYCLICS UR QL SCN: NEGATIVE
WBC NRBC COR # BLD: 12.02 10*3/MM3 (ref 3.4–10.8)

## 2022-12-17 PROCEDURE — 86850 RBC ANTIBODY SCREEN: CPT | Performed by: OBSTETRICS & GYNECOLOGY

## 2022-12-17 PROCEDURE — C1755 CATHETER, INTRASPINAL: HCPCS | Performed by: NURSE ANESTHETIST, CERTIFIED REGISTERED

## 2022-12-17 PROCEDURE — 59410 OBSTETRICAL CARE: CPT | Performed by: OBSTETRICS & GYNECOLOGY

## 2022-12-17 PROCEDURE — 86900 BLOOD TYPING SEROLOGIC ABO: CPT

## 2022-12-17 PROCEDURE — 80306 DRUG TEST PRSMV INSTRMNT: CPT | Performed by: OBSTETRICS & GYNECOLOGY

## 2022-12-17 PROCEDURE — 51703 INSERT BLADDER CATH COMPLEX: CPT

## 2022-12-17 PROCEDURE — 86900 BLOOD TYPING SEROLOGIC ABO: CPT | Performed by: OBSTETRICS & GYNECOLOGY

## 2022-12-17 PROCEDURE — C1755 CATHETER, INTRASPINAL: HCPCS

## 2022-12-17 PROCEDURE — 0 LIDOCAINE 1 % SOLUTION: Performed by: NURSE ANESTHETIST, CERTIFIED REGISTERED

## 2022-12-17 PROCEDURE — S0260 H&P FOR SURGERY: HCPCS | Performed by: OBSTETRICS & GYNECOLOGY

## 2022-12-17 PROCEDURE — 86901 BLOOD TYPING SEROLOGIC RH(D): CPT

## 2022-12-17 PROCEDURE — 0 PENICILLIN G POTASSIUM PER 600000 UNITS: Performed by: OBSTETRICS & GYNECOLOGY

## 2022-12-17 PROCEDURE — 86901 BLOOD TYPING SEROLOGIC RH(D): CPT | Performed by: OBSTETRICS & GYNECOLOGY

## 2022-12-17 PROCEDURE — 85027 COMPLETE CBC AUTOMATED: CPT | Performed by: OBSTETRICS & GYNECOLOGY

## 2022-12-17 RX ORDER — CARBOPROST TROMETHAMINE 250 UG/ML
250 INJECTION, SOLUTION INTRAMUSCULAR
Status: DISCONTINUED | OUTPATIENT
Start: 2022-12-17 | End: 2022-12-19 | Stop reason: HOSPADM

## 2022-12-17 RX ORDER — DOCUSATE SODIUM 100 MG/1
100 CAPSULE, LIQUID FILLED ORAL 2 TIMES DAILY
Status: DISCONTINUED | OUTPATIENT
Start: 2022-12-17 | End: 2022-12-19 | Stop reason: HOSPADM

## 2022-12-17 RX ORDER — BISACODYL 10 MG
10 SUPPOSITORY, RECTAL RECTAL DAILY PRN
Status: DISCONTINUED | OUTPATIENT
Start: 2022-12-18 | End: 2022-12-19 | Stop reason: HOSPADM

## 2022-12-17 RX ORDER — ONDANSETRON 4 MG/1
4 TABLET, FILM COATED ORAL EVERY 6 HOURS PRN
Status: DISCONTINUED | OUTPATIENT
Start: 2022-12-17 | End: 2022-12-19 | Stop reason: HOSPADM

## 2022-12-17 RX ORDER — ONDANSETRON 2 MG/ML
4 INJECTION INTRAMUSCULAR; INTRAVENOUS ONCE
Status: DISCONTINUED | OUTPATIENT
Start: 2022-12-17 | End: 2022-12-17

## 2022-12-17 RX ORDER — SODIUM CHLORIDE 0.9 % (FLUSH) 0.9 %
10 SYRINGE (ML) INJECTION EVERY 12 HOURS SCHEDULED
Status: DISCONTINUED | OUTPATIENT
Start: 2022-12-17 | End: 2022-12-17 | Stop reason: HOSPADM

## 2022-12-17 RX ORDER — LIDOCAINE HYDROCHLORIDE 10 MG/ML
5 INJECTION, SOLUTION EPIDURAL; INFILTRATION; INTRACAUDAL; PERINEURAL AS NEEDED
Status: DISCONTINUED | OUTPATIENT
Start: 2022-12-17 | End: 2022-12-17 | Stop reason: HOSPADM

## 2022-12-17 RX ORDER — OXYTOCIN/0.9 % SODIUM CHLORIDE 30/500 ML
250 PLASTIC BAG, INJECTION (ML) INTRAVENOUS CONTINUOUS
Status: ACTIVE | OUTPATIENT
Start: 2022-12-17 | End: 2022-12-17

## 2022-12-17 RX ORDER — PRENATAL VIT/IRON FUM/FOLIC AC 27MG-0.8MG
1 TABLET ORAL DAILY
Status: DISCONTINUED | OUTPATIENT
Start: 2022-12-17 | End: 2022-12-19 | Stop reason: HOSPADM

## 2022-12-17 RX ORDER — SODIUM CHLORIDE 0.9 % (FLUSH) 0.9 %
10 SYRINGE (ML) INJECTION AS NEEDED
Status: DISCONTINUED | OUTPATIENT
Start: 2022-12-17 | End: 2022-12-17 | Stop reason: HOSPADM

## 2022-12-17 RX ORDER — SODIUM CHLORIDE, SODIUM LACTATE, POTASSIUM CHLORIDE, CALCIUM CHLORIDE 600; 310; 30; 20 MG/100ML; MG/100ML; MG/100ML; MG/100ML
125 INJECTION, SOLUTION INTRAVENOUS CONTINUOUS
Status: DISCONTINUED | OUTPATIENT
Start: 2022-12-17 | End: 2022-12-17

## 2022-12-17 RX ORDER — MISOPROSTOL 200 UG/1
800 TABLET ORAL ONCE AS NEEDED
Status: DISCONTINUED | OUTPATIENT
Start: 2022-12-17 | End: 2022-12-19 | Stop reason: HOSPADM

## 2022-12-17 RX ORDER — LIDOCAINE HYDROCHLORIDE 10 MG/ML
INJECTION, SOLUTION INFILTRATION; PERINEURAL AS NEEDED
Status: DISCONTINUED | OUTPATIENT
Start: 2022-12-17 | End: 2022-12-17 | Stop reason: SURG

## 2022-12-17 RX ORDER — OXYTOCIN/0.9 % SODIUM CHLORIDE 30/500 ML
999 PLASTIC BAG, INJECTION (ML) INTRAVENOUS ONCE
Status: DISCONTINUED | OUTPATIENT
Start: 2022-12-17 | End: 2022-12-19 | Stop reason: HOSPADM

## 2022-12-17 RX ORDER — CALCIUM CARBONATE 200(500)MG
2 TABLET,CHEWABLE ORAL 3 TIMES DAILY PRN
Status: DISCONTINUED | OUTPATIENT
Start: 2022-12-17 | End: 2022-12-19 | Stop reason: HOSPADM

## 2022-12-17 RX ORDER — METHYLERGONOVINE MALEATE 0.2 MG/ML
200 INJECTION INTRAVENOUS ONCE AS NEEDED
Status: DISCONTINUED | OUTPATIENT
Start: 2022-12-17 | End: 2022-12-19 | Stop reason: HOSPADM

## 2022-12-17 RX ORDER — BUPIVACAINE HYDROCHLORIDE 2.5 MG/ML
INJECTION, SOLUTION EPIDURAL; INFILTRATION; INTRACAUDAL AS NEEDED
Status: DISCONTINUED | OUTPATIENT
Start: 2022-12-17 | End: 2022-12-17 | Stop reason: SURG

## 2022-12-17 RX ORDER — OXYTOCIN/0.9 % SODIUM CHLORIDE 30/500 ML
PLASTIC BAG, INJECTION (ML) INTRAVENOUS
Status: DISPENSED
Start: 2022-12-17 | End: 2022-12-17

## 2022-12-17 RX ORDER — SODIUM CHLORIDE 0.9 % (FLUSH) 0.9 %
1-10 SYRINGE (ML) INJECTION AS NEEDED
Status: DISCONTINUED | OUTPATIENT
Start: 2022-12-17 | End: 2022-12-19 | Stop reason: HOSPADM

## 2022-12-17 RX ORDER — ACETAMINOPHEN 500 MG
1000 TABLET ORAL EVERY 6 HOURS
Status: DISCONTINUED | OUTPATIENT
Start: 2022-12-17 | End: 2022-12-18

## 2022-12-17 RX ORDER — FERROUS SULFATE TAB EC 324 MG (65 MG FE EQUIVALENT) 324 (65 FE) MG
324 TABLET DELAYED RESPONSE ORAL 2 TIMES DAILY WITH MEALS
Status: DISCONTINUED | OUTPATIENT
Start: 2022-12-17 | End: 2022-12-19 | Stop reason: HOSPADM

## 2022-12-17 RX ORDER — HYDROCORTISONE 25 MG/G
1 CREAM TOPICAL 4 TIMES DAILY PRN
Status: DISCONTINUED | OUTPATIENT
Start: 2022-12-17 | End: 2022-12-19 | Stop reason: HOSPADM

## 2022-12-17 RX ORDER — SODIUM CHLORIDE, SODIUM LACTATE, POTASSIUM CHLORIDE, CALCIUM CHLORIDE 600; 310; 30; 20 MG/100ML; MG/100ML; MG/100ML; MG/100ML
INJECTION, SOLUTION INTRAVENOUS
Status: DISPENSED
Start: 2022-12-17 | End: 2022-12-17

## 2022-12-17 RX ORDER — ONDANSETRON 2 MG/ML
4 INJECTION INTRAMUSCULAR; INTRAVENOUS EVERY 6 HOURS PRN
Status: DISCONTINUED | OUTPATIENT
Start: 2022-12-17 | End: 2022-12-19 | Stop reason: HOSPADM

## 2022-12-17 RX ORDER — LIDOCAINE HYDROCHLORIDE AND EPINEPHRINE 15; 5 MG/ML; UG/ML
INJECTION, SOLUTION EPIDURAL AS NEEDED
Status: DISCONTINUED | OUTPATIENT
Start: 2022-12-17 | End: 2022-12-17 | Stop reason: SURG

## 2022-12-17 RX ORDER — IBUPROFEN 800 MG/1
800 TABLET ORAL EVERY 8 HOURS
Status: DISCONTINUED | OUTPATIENT
Start: 2022-12-17 | End: 2022-12-18

## 2022-12-17 RX ADMIN — DOCUSATE SODIUM 100 MG: 100 CAPSULE, LIQUID FILLED ORAL at 20:22

## 2022-12-17 RX ADMIN — BUPIVACAINE HYDROCHLORIDE 5 ML: 2.5 INJECTION, SOLUTION EPIDURAL; INFILTRATION; INTRACAUDAL; PERINEURAL at 04:33

## 2022-12-17 RX ADMIN — LIDOCAINE HYDROCHLORIDE AND EPINEPHRINE 3 ML: 15; 5 INJECTION, SOLUTION EPIDURAL at 04:26

## 2022-12-17 RX ADMIN — DOCUSATE SODIUM 100 MG: 100 CAPSULE, LIQUID FILLED ORAL at 10:47

## 2022-12-17 RX ADMIN — Medication 1 TABLET: at 10:47

## 2022-12-17 RX ADMIN — SODIUM CHLORIDE, POTASSIUM CHLORIDE, SODIUM LACTATE AND CALCIUM CHLORIDE 125 ML/HR: 600; 310; 30; 20 INJECTION, SOLUTION INTRAVENOUS at 04:30

## 2022-12-17 RX ADMIN — IBUPROFEN 800 MG: 800 TABLET, FILM COATED ORAL at 20:22

## 2022-12-17 RX ADMIN — SODIUM CHLORIDE 5 MILLION UNITS: 9 INJECTION, SOLUTION INTRAVENOUS at 04:43

## 2022-12-17 RX ADMIN — IBUPROFEN 800 MG: 800 TABLET, FILM COATED ORAL at 10:47

## 2022-12-17 RX ADMIN — LIDOCAINE HYDROCHLORIDE 5 ML: 10 INJECTION, SOLUTION INFILTRATION; PERINEURAL at 04:31

## 2022-12-17 RX ADMIN — ACETAMINOPHEN 1000 MG: 500 TABLET ORAL at 17:46

## 2022-12-17 RX ADMIN — FERROUS SULFATE TAB EC 324 MG (65 MG FE EQUIVALENT) 324 MG: 324 (65 FE) TABLET DELAYED RESPONSE at 10:47

## 2022-12-17 RX ADMIN — ACETAMINOPHEN 1000 MG: 500 TABLET ORAL at 10:47

## 2022-12-17 RX ADMIN — FERROUS SULFATE TAB EC 324 MG (65 MG FE EQUIVALENT) 324 MG: 324 (65 FE) TABLET DELAYED RESPONSE at 17:46

## 2022-12-17 NOTE — ANESTHESIA PREPROCEDURE EVALUATION
Anesthesia Evaluation     Patient summary reviewed and Nursing notes reviewed   no history of anesthetic complications:  NPO Solid Status: > 6 hours  NPO Liquid Status: < 2 hours           Airway   Mallampati: II  TM distance: >3 FB  Neck ROM: full  No difficulty expected  Dental - normal exam     Pulmonary - negative pulmonary ROS and normal exam   (-) not a smoker  Cardiovascular - negative cardio ROS and normal exam    (-) hypertension, CAD      Neuro/Psych  (+) headaches, psychiatric history Anxiety and Depression,    (-) seizures, TIA, CVA  GI/Hepatic/Renal/Endo    (+)  GERD,      Musculoskeletal (-) negative ROS    Abdominal    Substance History   (+) drug use (THC)     OB/GYN    (+) Pregnant,   (-) Preeclampsia and history of pregnancy induced hypertension        Other - negative ROS                       Anesthesia Plan    ASA 2     epidural       Anesthetic plan, risks, benefits, and alternatives have been provided, discussed and informed consent has been obtained with: patient.  Pre-procedure education provided      CODE STATUS:

## 2022-12-17 NOTE — ANESTHESIA POSTPROCEDURE EVALUATION
Patient: Teresa Catalan    Procedure Summary     Date: 12/17/22 Room / Location:     Anesthesia Start: 0415 Anesthesia Stop: 0700    Procedure: LABOR ANALGESIA Diagnosis:     Scheduled Providers:  Provider: Kassie Christopher CRNA    Anesthesia Type: epidural ASA Status: 2          Anesthesia Type: epidural    Vitals  Vitals Value Taken Time   /63 12/17/22 0749   Temp     Pulse 90 12/17/22 0735   Resp 20 12/17/22 0725   SpO2 98 % 12/17/22 0605           Post Anesthesia Care and Evaluation    Patient location during evaluation: bedside  Patient participation: complete - patient participated  Level of consciousness: awake and awake and alert  Pain management: satisfactory to patient    Airway patency: patent  Anesthetic complications: No anesthetic complications  PONV Status: none  Cardiovascular status: acceptable and stable  Respiratory status: acceptable, room air and spontaneous ventilation  Hydration status: acceptable  Post Neuraxial Block status: Motor and sensory function returned to baseline and No signs or symptoms of PDPH

## 2022-12-17 NOTE — L&D DELIVERY NOTE
[facility]  Vaginal Delivery Note    Patient Name: Teresa Catalan  : 2000  MRN: [id number]  Date of Delivery: 2022     Diagnosis     Pre & Post-Delivery:  Intrauterine pregnancy at 37w3d  Labor status: Spontaneous Onset of Labor     Normal labor    Primigravida in third trimester    Gastroesophageal reflux during pregnancy, antepartum    Chlamydia infection during pregnancy    Single liveborn infant delivered vaginally             Problem List    Transfer to Postpartum     Review the Delivery Report for details.     Delivery     Delivery: Vaginal, Spontaneous     YOB: 2020    Time of Birth:  Gestational Age 7:00 AM   37w3d     Anesthesia: Epidural     Delivering clinician: Ramonita Delong    Forceps?   No   Vacuum? No    Shoulder dystocia present: No        Delivery narrative:     Patient presented at 3AM when she was 5-6 cm after spontaneous ruptured membranes at ~1AM at home.  She progressed to complete cervical dilation at approximately 6AM without augmentation.  Patient pushed to deliver a viable 3100g female from the CHELA position over an intact perineum via  under epidural anesthesia on 2022 at 7:00AM.  No noted nuchal cord.  Right anterior shoulder was delivered initially with ease but then required some gentle maneuvering to deliver the rest of the anterior shoulder, followed by left posterior shoulder.  Body was then delivered with gentle traction.  Mouth and nose bulb suctioned.  3 vessel cord clamped x2 and cut after 30 seconds of delayed clamping.  Baby was placed on mother's chest.  Cord blood was obtained and sent.  Placenta delivered spontaneously intact and Pitocin was started.  She continued to have bleeding with atony so the Pitocin was bolused in.  Uterine tone improved.  Cervix, vagina, and perineum were examined and a no laceration was noted.  EBL 450mL; QBL pending, please see nursing documentation.  Apgar scores  8/9.  Mother and infant stable.      Infant     Findings: female  infant     Infant observations: Weight: 3100 g (6 lb 13.4 oz)   Length: 19.882  in  Observations/Comments:        Apgars: 8  @ 1 minute /    9  @ 5 minutes   Infant Name: Donna Kc     Placenta & Cord         Placenta delivered  Spontaneous  at   12/17/2022  7:10 AM     Cord: 3 vessels  present.   Nuchal Cord?  no   Cord blood obtained: Yes    Cord gases obtained:  No    Cord gas results: Venous:  No results found for: PHCVEN    Arterial:  No results found for: PHCART     Repair      Episiotomy: None    No    Lacerations: No   Estimated Blood Loss: 450 mL     Quantitative Blood Loss: Quantitative Blood Loss (mL): 450 mL (12/17/22 0725)        Complications     none    Disposition     Mother to Mother Baby/Postpartum in stable condition currently.  Baby to remains with mom in stable condition currently.    Ramonita Delong MD  12/17/22  07:23 CST

## 2022-12-17 NOTE — ANESTHESIA PROCEDURE NOTES
Labor Epidural      Patient reassessed immediately prior to procedure    Patient location during procedure: OB  Start Time: 12/17/2022 4:20 AM  Stop Time: 12/17/2022 4:29 AM  Indication:at surgeon's request  Performed By  CRNA/HITESH: Kassie Christopher CRNA  Preanesthetic Checklist  Completed: patient identified, IV checked, site marked, risks and benefits discussed, surgical consent, monitors and equipment checked, pre-op evaluation and timeout performed  Prep:  Pt Position:sitting  Sterile Tech:cap, gloves, mask and sterile barrier  Prep:povidone-iodine 7.5% surgical scrub  Monitoring:blood pressure monitoring and continuous pulse oximetry  Epidural Block Procedure:  Approach:midline  Guidance:landmark technique  Location:L3-L4  Needle Type:Tuohy  Needle Gauge:19 G  Loss of Resistance: 8cm  Cath Depth at skin:13 cm  Paresthesia: none  Aspiration:negative  Test Dose:negative  Number of Attempts: 1  Post Assessment:  Dressing:occlusive dressing applied and secured with tape  Pt Tolerance:patient tolerated the procedure well with no apparent complications  Complications:no

## 2022-12-17 NOTE — H&P
"[facility]  HISTORY & PHYSICAL - Obstetrics    Name: Teresa Catalan  MRN: [id number]  Location:   Date: 2022   CSN: [id number]      CHIEF COMPLAINT: \"My water is broke\"    HISTORY OF PRESENT ILLNESS  Teresa Catalan is a 22 y.o.  at 37w3d who presents with a chief complaint of ruptured membranes that occurred at 1AM.  She states that she had some cramping around 11PM but couldn't tell if they were contractions or not.  She went to [facility] where ruptured membranes were confirmed; she left there AMA to come here.  She also reports painful contractions.  Reports good FM.    Patient denies any chest pain, palpitations, headaches, lightheadedness, shortness of breath, cough, nausea, vomiting, diarrhea, constipation, fever, or chills.    ROS  Review of Systems   Constitutional: Negative.    HENT: Negative.    Eyes: Negative.    Respiratory: Negative.    Cardiovascular: Negative.    Gastrointestinal: Negative.    Endocrine: Negative.    Genitourinary: Negative.    Musculoskeletal: Negative.    Skin: Negative.    Allergic/Immunologic: Negative.    Neurological: Negative.    Hematological: Negative.    Psychiatric/Behavioral: Negative.      PRENATAL LAB RESULTS  Prenatal labs reviewed  External Prenatal Results     Pregnancy Outside Results - Transcribed From Office Records - See Scanned Records For Details     Test Value Date Time    ABO  A  22 0402    Rh  Positive  22 0402    Antibody Screen  Negative  22 0402      ^ Normal  22     Varicella IgG       Rubella ^ immune  22     Hgb  12.2 g/dL 22 0402       10.8 g/dL 10/12/22 0955      ^ 13.0 g/dL 22     Hct  36.8 % 22 0402       32.2 % 10/12/22 0955      ^ 39 % 22       ^ 39 % 22     Glucose Fasting GTT       Glucose Tolerance Test 1 hour       Glucose Tolerance Test 3 hour       Gonorrhea (discrete) ^ negative  22     Chlamydia (discrete) ^ positive  22     RPR " ^ Non-Reactive  06/28/22     VDRL       Syphilis Antibody       HBsAg ^ Negative  06/28/22     Herpes Simplex Virus PCR       Herpes Simplex VIrus Culture       HIV ^ non reactive  06/28/22     Hep C RNA Quant PCR       Hep C Antibody ^ negative  06/28/22     AFP       Group B Strep  Positive  12/08/22 1401    GBS Susceptibility to Clindamycin       GBS Susceptibility to Erythromycin       Fetal Fibronectin       Genetic Testing, Maternal Blood             Drug Screening     Test Value Date Time    Urine Drug Screen       Amphetamine Screen  Negative  12/17/22 0415       Negative  09/16/22 1303      ^ Negative  06/28/22     Barbiturate Screen  Negative  12/17/22 0415       Negative  09/16/22 1303      ^ negative  06/28/22     Benzodiazepine Screen  Negative  12/17/22 0415       Negative  09/16/22 1303      ^ Negative  06/28/22     Methadone Screen  Negative  12/17/22 0415       Negative  09/16/22 1303    Phencyclidine Screen  Negative  12/17/22 0415       Negative  09/16/22 1303      ^ negative  06/28/22     Opiates Screen  Negative  12/17/22 0415       Negative  09/16/22 1303      ^ Negative  06/28/22     THC Screen  Negative  12/17/22 0415       Negative  09/16/22 1303      ^ Positive  06/28/22     Cocaine Screen ^ negative  06/28/22     Propoxyphene Screen  Negative  12/17/22 0415       Negative  09/16/22 1303    Buprenorphine Screen  Negative  12/17/22 0415       Negative  09/16/22 1303    Methamphetamine Screen       Oxycodone Screen  Negative  12/17/22 0415       Negative  09/16/22 1303    Tricyclic Antidepressants Screen  Negative  12/17/22 0415       Negative  09/16/22 1303          Legend    ^: Historical                      PRENATAL RISK FACTORS  9/22 Problems (from 09/07/22 to present)     Problem Noted Resolved    Chlamydia infection during pregnancy 12/8/2022 by Harini Castro APRN No    Overview Signed 12/8/2022  2:28 PM by Harini Castro APRN     ÓSCAR normal on 8/2022-in scanned records          Gastroesophageal reflux during pregnancy in second trimester, antepartum 10/12/2022 by Marlen Benedict APRN No    Overview Signed 2022 11:37 AM by Marlen Benedict APRN     omeprazole         Primigravida in third trimester 2022 by Marlen Benedict APRN No        OB HISTORY  OB History    Para Term  AB Living   1             SAB IAB Ectopic Molar Multiple Live Births                    # Outcome Date GA Lbr Eleazar/2nd Weight Sex Delivery Anes PTL Lv   1 Current              GYN HISTORY  +Chlamydia  Denies h/o gynecologic surgeries, including biopsies of the cervix    PAST MEDICAL HISTORY  Past Medical History:   Diagnosis Date   • Anxiety    • Chlamydia    • Depression    • GERD (gastroesophageal reflux disease)    • Hemorrhoids    • Migraine    • Substance abuse (HCC)      PAST SURGICAL HISTORY  Past Surgical History:   Procedure Laterality Date   • EAR TUBES     • SHOULDER SURGERY     • WISDOM TOOTH EXTRACTION       FAMILY HISTORY  Family History   Problem Relation Age of Onset   • Migraines Mother    • Hypertension Father    • No Known Problems Brother    • COPD Maternal Grandmother    • Skin cancer Maternal Grandfather    • Stomach cancer Paternal Grandmother    • Heart disease Paternal Grandfather      SOCIAL HISTORY  Social History     Socioeconomic History   • Marital status: Single   Tobacco Use   • Smoking status: Never   • Smokeless tobacco: Never   Vaping Use   • Vaping Use: Every day   Substance and Sexual Activity   • Alcohol use: Never   • Drug use: Not Currently     Types: Marijuana   • Sexual activity: Yes     Partners: Male     ALLERGIES  Allergies   Allergen Reactions   • Adhesive Tape Rash     HOME MEDICATIONS  Prior to Admission medications    Medication Sig Start Date End Date Taking? Authorizing Provider   PRENATAL GUMMY VITAMIN Chew 1 each Daily.   Yes Provider, MD Arpit   calcium carbonate (TUMS) 500 MG chewable tablet Chew 2 tablets As Needed for Indigestion or  Heartburn.    Provider, MD Arpit   omeprazole (priLOSEC) 40 MG capsule Take 1 capsule by mouth Daily. 10/12/22 12/17/22  Marlen Benedict, APRN     PHYSICAL EXAM  /77   Pulse 74   Temp 98.2 °F (36.8 °C) (Oral)   Resp 18   LMP 2022 (Exact Date)   SpO2 94%   Breastfeeding No   General: No acute distress. Well developed, well nourished. Pleasant.  Heart: Regular rate and rhythm. No murmurs, rubs, or gallops  Lungs: Clear to auscultation bilaterally. No wheezes, rales, or rhonchi.  Abdomen: Soft, nontender to palpation, enlarged by gravid uterus.    FHT Cat 1    SVE on admission per RN at 0340: 5-6 cm, vertex    IMPRESSION  Teresa Catalan is a 22 y.o.  at 37w3d admitted with term labor.  Will expectantly manage and augment if necessary.    PLAN  1.  IUP at 37w3d with term labor  - Admit: Labor and Delivery  - Attending: Dr. Delong  - Condition: Stable  - Vitals: per protocol  - Activity: ad praveen  - Nursing: Continuous electronic fetal monitoring, as per protocol  - Diet: Clears  - IV fluids:  mL/hr  - Meds: None  - Allergies: Adhesive  - Labs: CBC, T&S, UDS  - GBS: POS.  Antibiotics: PCN  - Teresa Catalan and I have discussed pain goals for this hospitalization after reviewing her current clinical condition, medical history and prior pain experiences.  The goal is to keep her pain level appropriate.  Patient does desire an epidural.  - Anticipate     This document has been electronically signed by Ramonita Delong MD on 2022 06:42 CST.

## 2022-12-18 PROCEDURE — 0503F POSTPARTUM CARE VISIT: CPT | Performed by: OBSTETRICS & GYNECOLOGY

## 2022-12-18 RX ORDER — IBUPROFEN 800 MG/1
800 TABLET ORAL EVERY 8 HOURS PRN
Status: DISCONTINUED | OUTPATIENT
Start: 2022-12-18 | End: 2022-12-19 | Stop reason: HOSPADM

## 2022-12-18 RX ORDER — ACETAMINOPHEN 500 MG
1000 TABLET ORAL EVERY 6 HOURS PRN
Status: DISCONTINUED | OUTPATIENT
Start: 2022-12-18 | End: 2022-12-19 | Stop reason: HOSPADM

## 2022-12-18 RX ADMIN — IBUPROFEN 800 MG: 800 TABLET, FILM COATED ORAL at 17:10

## 2022-12-18 RX ADMIN — DOCUSATE SODIUM 100 MG: 100 CAPSULE, LIQUID FILLED ORAL at 20:29

## 2022-12-18 RX ADMIN — FERROUS SULFATE TAB EC 324 MG (65 MG FE EQUIVALENT) 324 MG: 324 (65 FE) TABLET DELAYED RESPONSE at 08:58

## 2022-12-18 RX ADMIN — ACETAMINOPHEN 1000 MG: 500 TABLET ORAL at 09:01

## 2022-12-18 RX ADMIN — DOCUSATE SODIUM 100 MG: 100 CAPSULE, LIQUID FILLED ORAL at 08:58

## 2022-12-18 RX ADMIN — FERROUS SULFATE TAB EC 324 MG (65 MG FE EQUIVALENT) 324 MG: 324 (65 FE) TABLET DELAYED RESPONSE at 17:10

## 2022-12-18 RX ADMIN — Medication 1 TABLET: at 08:58

## 2022-12-18 NOTE — PLAN OF CARE
Problem: Adult Inpatient Plan of Care  Goal: Plan of Care Review  Outcome: Ongoing, Progressing  Flowsheets  Taken 12/18/2022 1717 by Milly Bass, RN  Progress: improving  Outcome Evaluation: VSS, ambulating to NICU multiple times, utilizing double electric breast pump, mild cramping/pain controlled with PRN medications, speaks positively about infant, resting well throughout the day.  Taken 12/18/2022 0616 by Juan Thompson, RN  Plan of Care Reviewed With: patient   Goal Outcome Evaluation:           Progress: improving  Outcome Evaluation: VSS, ambulating to NICU multiple times, utilizing double electric breast pump, mild cramping/pain controlled with PRN medications, speaks positively about infant, resting well throughout the day.

## 2022-12-18 NOTE — PLAN OF CARE
Problem: Adult Inpatient Plan of Care  Goal: Plan of Care Review  Outcome: Ongoing, Progressing  Flowsheets (Taken 12/18/2022 0616)  Progress: improving  Plan of Care Reviewed With: patient  Outcome Evaluation: VSS. Voids, ambulates. Breast pump utilized. Pt rested throughout night and VAS 0/10   Goal Outcome Evaluation:  Plan of Care Reviewed With: patient        Progress: improving  Outcome Evaluation: VSS. Voids, ambulates. Breast pump utilized. Pt rested throughout night and VAS 0/10

## 2022-12-18 NOTE — PROGRESS NOTES
[facility]  Progress Note - Obstetrics    Name: Teresa Catalan  MRN: [id number]  Location: M757/1  Date: 2022  CSN: [id number]       PPD #1 s/p  at 37w3d secondary to term labor    Patient seen and examined.  Denies headache, dizziness, chest pain, shortness of breath, nausea, vomiting.  Minimal/moderate lochia.  OOB and ambulating.  Tolerating regular diet.  Voiding without difficulty.  Breastpumping.  Baby Emersyn in the NICU for respiratory concerns, doing well.    PHYSICAL EXAM  /86 (BP Location: Right arm, Patient Position: Sitting)   Pulse 87   Temp 97.7 °F (36.5 °C) (Oral)   Resp 16   LMP 2022 (Exact Date)   SpO2 98%   Breastfeeding Yes   General: AAOx3, no apparent distress.  Lungs: CTA B/L anteriorly, no wheezes, rales, rhonchi.  CV: Acceptable rate, regular rhythm, S1/S2 normal.  Abdomen: +BS, soft, nondistended, uterine fundus firm, nontender, and below umbilicus.  Lower extremities: Mild bilateral edema.  2+/4+ dorsalis pedis pulses B/L.    Intake/Output Summary (Last 24 hours) at 2022 0900  Last data filed at 2022 0920  Gross per 24 hour   Intake --   Output 22 ml   Net -22 ml       IMPRESSION  Teresa Catalan is a 22 y.o.  PPD #1 s/p  at 37w3d secondary to term labor.  Doing well and recovering appropriately.    PLAN  1.  Postpartum s/p   - Continue routine postpartum care.  - Diet: Pregnancy/breastfeeding  - DVT prophylaxis: SCDs  - Breastfeeding  - Contraception: Declines at this time  - Discharge to home tomorrow.  Follow-up in 2 weeks (telephone visit), 6 weeks.    This document has been electronically signed by Ramonita Delong MD on 2022 09:00 CST.

## 2022-12-19 VITALS
DIASTOLIC BLOOD PRESSURE: 83 MMHG | OXYGEN SATURATION: 98 % | WEIGHT: 221 LBS | RESPIRATION RATE: 18 BRPM | SYSTOLIC BLOOD PRESSURE: 126 MMHG | HEIGHT: 67 IN | HEART RATE: 89 BPM | BODY MASS INDEX: 34.69 KG/M2 | TEMPERATURE: 97.9 F

## 2022-12-19 PROCEDURE — 0503F POSTPARTUM CARE VISIT: CPT | Performed by: OBSTETRICS & GYNECOLOGY

## 2022-12-19 RX ORDER — IBUPROFEN 800 MG/1
800 TABLET ORAL EVERY 8 HOURS PRN
Qty: 30 TABLET | Refills: 0 | Status: SHIPPED | OUTPATIENT
Start: 2022-12-19 | End: 2023-02-06

## 2022-12-19 RX ORDER — ACETAMINOPHEN 500 MG
1000 TABLET ORAL EVERY 6 HOURS PRN
Qty: 30 TABLET | Refills: 0 | Status: SHIPPED | OUTPATIENT
Start: 2022-12-19 | End: 2023-02-06

## 2022-12-19 RX ADMIN — DOCUSATE SODIUM 100 MG: 100 CAPSULE, LIQUID FILLED ORAL at 08:29

## 2022-12-19 RX ADMIN — Medication 1 TABLET: at 08:29

## 2022-12-19 RX ADMIN — FERROUS SULFATE TAB EC 324 MG (65 MG FE EQUIVALENT) 324 MG: 324 (65 FE) TABLET DELAYED RESPONSE at 08:29

## 2022-12-19 NOTE — DISCHARGE SUMMARY
"[facility]  Discharge Summary  Patient Name: Teresa Catalan  : 2000  MRN: [id number]  CSN: [id number]    Discharge Summary    Date of Admission: 2022   Date of Discharge: 2022    Principle Discharge Dx: Active Hospital Problems    Diagnosis  POA   • **Normal labor [O80, Z37.9]  Not Applicable   • Single liveborn infant delivered vaginally [Z38.00]  No   • Chlamydia infection during pregnancy [O98.819, A74.9]  Yes     ÓSCAR normal on 2022-in scanned records     • Gastroesophageal reflux during pregnancy, antepartum [O99.619, K21.9]  Yes     omeprazole     • Primigravida in third trimester [Z34.03]  Not Applicable      Procedures Performed:    Brief History: Patient is a 22 y.o. now  who presented to labor and delivery at 37w3d.   Hospital Course: Patient presented to L&D at 37w3d.  She had a .  Her postpartum course was unremarkable.  On PPD #2 she expressed the desire for discharge.  She had passed gas and was urinating normally.  She was eating a regular diet without difficulty.  She was ambulating well.  Discharge instructions were given.  All questions were answered    Vitals:    22 1420 22 1715 22 2200 22 0456   BP: 130/80 136/84 122/76 (!) 89/53   BP Location:   Right arm Left arm   Patient Position:   Sitting Lying   Pulse: 84 88 91 73   Resp: 18 18 18 16   Temp: 98 °F (36.7 °C) 98 °F (36.7 °C) 97.9 °F (36.6 °C) 97.8 °F (36.6 °C)   TempSrc: Oral  Oral Oral   SpO2: 100% 98% 98% 100%   Weight:   100 kg (221 lb)    Height:   170.2 cm (67\")      Physical Exam  Vitals reviewed.   Constitutional:       Appearance: Normal appearance.   Cardiovascular:      Rate and Rhythm: Normal rate and regular rhythm.      Pulses: Normal pulses.      Heart sounds: Normal heart sounds.   Pulmonary:      Effort: Pulmonary effort is normal.      Breath sounds: Normal breath sounds.   Abdominal:      General: Bowel sounds are normal. There is no " distension.      Palpations: Abdomen is soft.      Tenderness: There is no abdominal tenderness.      Comments: Uterus firm and below umbilicus   Musculoskeletal:      Cervical back: Neck supple.      Right lower leg: No edema.      Left lower leg: No edema.   Skin:     General: Skin is warm.   Neurological:      Mental Status: She is alert and oriented to person, place, and time.   Psychiatric:         Mood and Affect: Mood normal.         Behavior: Behavior normal.              Condition:  Discharge Activity:                                                      Discharge Diet: Stable  Activity Instructions     Bathing Restrictions      Type of Restriction: Bathing    Bathing Restrictions: Other    Explain Bathing Restrictions: No soaking in bathtub for 4 weeks. Showers are fine.    Driving Restrictions      Type of Restriction: Driving    Driving Restrictions: No Driving (Time Limited)    Length: Other    Indicate Length of Restriction: No driving for 1 week or if using narcotic pain medications. Riding is car is fine.    Lifting Restrictions      Type of Restriction: Lifting    Lifting Restrictions: Other    Explain Lifing Restrictions: No lifting more than infant and baby carrier together for 6 weeks.    Pelvic Rest      Nothing in the vagina for 6 weeks to include tampons, intercourse, or douching.    Sexual Activity Restrictions      Type of Restriction: Sex    Explain Sexual Activity Restrictions: No sexual intercourse for at least 6 weeks        Regular   Discharge Medications:    Your medication list      START taking these medications      Instructions Last Dose Given Next Dose Due   acetaminophen 500 MG tablet  Commonly known as: TYLENOL      Take 2 tablets by mouth Every 6 (Six) Hours As Needed for Mild Pain.       ibuprofen 800 MG tablet  Commonly known as: ADVIL,MOTRIN      Take 1 tablet by mouth Every 8 (Eight) Hours As Needed for Mild Pain.          CONTINUE taking these medications      Instructions  Last Dose Given Next Dose Due   calcium carbonate 500 MG chewable tablet  Commonly known as: TUMS      Chew 2 tablets As Needed for Indigestion or Heartburn.       PRENATAL GUMMY VITAMIN      Chew 1 each Daily.             Where to Get Your Medications      These medications were sent to [facility] - [place], [place] - [address] - [phone] PH - [phone] FX  [address], [place] [address]    Phone: [phone] ·   acetaminophen 500 MG tablet  · ibuprofen 800 MG tablet        Discharge Disposition: Home   Follow-up: No future appointments.  2 week PP visit (telephone)  6 week PP visit  Breastpumping  Declines contraception at this time       <30 minutes was spent with the patient on the day of discharge.        This document has been electronically signed by Frantz Umana MD on December 19, 2022 07:03 CST

## 2022-12-27 ENCOUNTER — PATIENT OUTREACH (OUTPATIENT)
Dept: OBSTETRICS AND GYNECOLOGY | Facility: HOSPITAL | Age: 22
End: 2022-12-27

## 2022-12-27 NOTE — OUTREACH NOTE
[facility]  Postpartum Check-In    Questions/Answers    Flowsheet Row Responses   Visit Setting Telephone   Best Method for Contacting Cell   OB Discharge Note Reviewed  Reviewed   OB Discharge Navigator Reviewed  Reviewed   OB Discharge Medications Reviewed  Reviewed   [place] discharged home with mother? Yes   Current Pain Levels 0-10 0   At Rest Pain Levels 0-10 0   Pain level with activity 0-10 0   Acceptable Pain Level 0-10 5   How do you treat your pain? Support Bra   Verbalized Emotional State Acceptance   Family/Support Network Family, Significant Other   Level of Involvement in Care Attentive, Supportive   Do you feel comfortable in your relationship with your baby? Yes   Have members of your household adjusted to your baby? Yes   Is the baby's father supportive and/or involved with the baby? Yes   How does your partner feel about the baby? Happy   Do you feel safe at home, school and work? Yes   Are you in a relationship with someone who threatens you or hurts you? No   Do you have the resources to keep yourself and your baby healthy and safe? Yes   Lochia (per patient report) Brown-sandra Red   Amount Spotting   Number of pads per day 4   Lochia Odor None   Is patient breastfeeding? Yes, pumping   Breast Care Supportive Bra, Breast Pads   pumping - Left Breast Soft, Nontender   Pumping - Right Breast Nontender, Soft   Pumping - Left Nipple Intact, Nontender   Pumping - Right Nipple Intact, Nontender   Frequency q 3-4 hours   Pumping Quantity 3-4 oz   Storage of Milk fridge   Postpartum Depression Screening Education Education Provided   Peripheral Blood Glucose Declined   Doctor Appointments: Education Provided   Breastfeeding Education Education Provided   Family Planning Education Education Provided   Postpartum Care Education Education Provided   S & S to report Education Provided   Followup Appointments Made N/A   Well Child Visit Appointments Made N/A   Did you complete the visit? Yes   Were  there any specific concerns? No   Umbilical Cord No reported signs or symptoms   Was the baby circumcised? No   Feeding Readiness Cues: Eager, Finger Sucking   Infant Feeding Method Expressed Breast Milk   Number of wet diapers x 24 hours 8   Last BM x 24 hours 4   Emesis (Unmeasured Occurence) denies   What safe sleep surface is available? Bassinet   Where does the baby usually sleep? Bassinet   Does the baby ever share a sleep surface with a sibling, adult or pet? No   Does the baby ever share a sleep surface in a bed, couch, recliner or other? No   What position do you place your baby to sleep for naps? Back   What position do you place your baby to sleep at night Back   Are you and/or other caregivers smoking inside or outside the baby's home? No   Is the infant dressed appropiately for the temperature of the home? Yes   Do you use a clean, dry pacifier that is not attached to a string or stuffed animal? Yes          Review of Systems- seen in urgent care today for bronchitis and pneumonia.    [place]  Depression Score: 6 (2022  5:14 PM)      Ree Gamble RN  Maternity Nurse Navigator    2022, 14:24 CST

## 2023-01-03 ENCOUNTER — PATIENT OUTREACH (OUTPATIENT)
Dept: CALL CENTER | Facility: HOSPITAL | Age: 23
End: 2023-01-03
Payer: COMMERCIAL

## 2023-01-03 ENCOUNTER — OFFICE VISIT (OUTPATIENT)
Dept: OBSTETRICS AND GYNECOLOGY | Facility: CLINIC | Age: 23
End: 2023-01-03
Payer: COMMERCIAL

## 2023-01-03 DIAGNOSIS — N64.4 NIPPLE PAIN: ICD-10-CM

## 2023-01-03 DIAGNOSIS — N89.8 VAGINAL ODOR: ICD-10-CM

## 2023-01-03 PROCEDURE — 1159F MED LIST DOCD IN RCRD: CPT | Performed by: OBSTETRICS & GYNECOLOGY

## 2023-01-03 PROCEDURE — 0503F POSTPARTUM CARE VISIT: CPT | Performed by: OBSTETRICS & GYNECOLOGY

## 2023-01-03 PROCEDURE — 1160F RVW MEDS BY RX/DR IN RCRD: CPT | Performed by: OBSTETRICS & GYNECOLOGY

## 2023-01-03 NOTE — PROGRESS NOTES
[facility]  Obstetrics  Date of Service: 2023    CC: Postpartum visit      Teresa Catalan is a 22 y.o.  s/p Vaginal, Spontaneous on 2022 at 37w3d secondary to term labor who presents today for postpartum check.  The patient states she is doing well.  She was diagnosed with bronchitis last week; she is feeling better.  Patient states that she has been crying quite a bit.  She states that her mood goes up and down.  She states she just doesn't want to do anything.  Denies SI/HI.  Menstrual cycles have not resumed.  Breastfeeding.  She has not resumed sexual intercourse.  Denies bowel or bladder issues.  She states that her nipples are cracked and painful; she is using Lanolin ointment.  She has had some random occasional vaginal pain and vaginal odor.    PHYSICAL EXAM:    LMP 2022 (Exact Date)   Breastfeeding Yes   Postpartum Depression Screening Questionnaire: 8    IMPRESSION/PLAN: Teresa Catalan is a 22 y.o.  s/p Vaginal, Spontaneous on .  Doing well.   - Recovered nicely from her delivery  - Contraception: NuvaRing  - Continue restrictions  - PPD/Baby Blues: Declines treatment at this time.  Precautions given.  - Vag panel; patient may self-collect in [place] lab  - Will send in APNO cream to compound pharmacy  - RTC 4 weeks for final PP visit    This document has been electronically signed by Ramonita Delong MD on January 3, 2023 11:06 CST.    This visit has been rescheduled as a phone visit to comply with patient safety concerns in accordance with CDC recommendations.  Total time of discussion was 8 minutes.  The use of a telephone visit has been reviewed with the patient and verbal informed consent has been obtained.

## 2023-01-03 NOTE — OUTREACH NOTE
Motherhood Connection Survey    Flowsheet Row Responses   [facility] patient discharged from? [place]   Week 1 attempt successful? Yes   Call end time 1053   Baby sex Girl    discharged home with mother? Yes   Baby sex Girl   Delivery type Vaginal   Emotional state Acceptance   Family support Yes   Do you have all necessary resources to care for you and your baby?  Yes   Have members of your household adjusted to your baby? Yes   Did you have any problems with pre-eclampsia during this pregnancy? No   Did you have blood glucose issues during this pregnancy No   Lochia amount Light   Lochia per patient report Serosa   Did you have an episiotomy/tear/abdominal incision? No   Feeding Method Breast   Pumping Yes   Breast Condition No   Nipple Condition Yes  [PUMP SHE HAS IS \"HARD ON HER NIPPLES\" BUT SHE IS WORKING ON GETTING A NEW ONE]   Signs baby is ready to eat Crying, Rooting   Feeding tolerance Wet/dirty diapers, Weight gain   Number of wet diapers x 24 hours 10   Last BM x 24 hours 3-4   Umbilical Cord No reported signs or symptoms   Where does the baby usually sleep? Bassinet   Are there stuffed animals, toys, pillows, quilts, blankets, wedges, positioners, bumpers or other loose bedding in the infant's sleeping environment? No   Does the baby ever share a sleep surface in a bed, couch, recliner or other? No   What position do you lay your baby down to sleep? Back   Are you and/or other caregivers smoking inside or outside the baby's home? No   Mom appointment comments: MOM HAS A VIRTUAL VISIT TODAY   Baby appointment comments: BABY HAS SEEN HER PEDIATRICIAN   Call completed? Yes   How satisfied were you with the Motherhood Connection Program? 5            SAUL RODRÍGUEZ - Licensed Nurse

## 2023-01-05 ENCOUNTER — OFFICE VISIT (OUTPATIENT)
Dept: OBSTETRICS AND GYNECOLOGY | Facility: CLINIC | Age: 23
End: 2023-01-05
Payer: COMMERCIAL

## 2023-01-05 DIAGNOSIS — N89.8 VAGINAL DISCHARGE: Primary | ICD-10-CM

## 2023-01-05 PROCEDURE — 87660 TRICHOMONAS VAGIN DIR PROBE: CPT | Performed by: NURSE PRACTITIONER

## 2023-01-05 PROCEDURE — 87510 GARDNER VAG DNA DIR PROBE: CPT | Performed by: NURSE PRACTITIONER

## 2023-01-05 PROCEDURE — 87480 CANDIDA DNA DIR PROBE: CPT | Performed by: NURSE PRACTITIONER

## 2023-01-06 DIAGNOSIS — N76.0 BV (BACTERIAL VAGINOSIS): Primary | ICD-10-CM

## 2023-01-06 DIAGNOSIS — B96.89 BV (BACTERIAL VAGINOSIS): Primary | ICD-10-CM

## 2023-01-06 LAB
CANDIDA ALBICANS: NEGATIVE
GARDNERELLA VAGINALIS: POSITIVE
T VAGINALIS DNA VAG QL PROBE+SIG AMP: NEGATIVE

## 2023-01-06 RX ORDER — METRONIDAZOLE 500 MG/1
500 TABLET ORAL 2 TIMES DAILY
Qty: 14 TABLET | Refills: 0 | Status: SHIPPED | OUTPATIENT
Start: 2023-01-06 | End: 2023-01-13

## 2023-01-13 ENCOUNTER — TELEPHONE (OUTPATIENT)
Dept: OBSTETRICS AND GYNECOLOGY | Facility: CLINIC | Age: 23
End: 2023-01-13

## 2023-01-13 NOTE — TELEPHONE ENCOUNTER
PT called and requested a prescription for a nuvaring sent to [facility] in [place]. PT can be reached at [phone] to discuss further if necessary.

## 2023-02-06 ENCOUNTER — POSTPARTUM VISIT (OUTPATIENT)
Dept: OBSTETRICS AND GYNECOLOGY | Facility: CLINIC | Age: 23
End: 2023-02-06
Payer: COMMERCIAL

## 2023-02-06 VITALS
BODY MASS INDEX: 30.45 KG/M2 | SYSTOLIC BLOOD PRESSURE: 122 MMHG | HEIGHT: 67 IN | WEIGHT: 194 LBS | DIASTOLIC BLOOD PRESSURE: 70 MMHG

## 2023-02-06 DIAGNOSIS — Z30.015 ENCOUNTER FOR INITIAL PRESCRIPTION OF VAGINAL RING HORMONAL CONTRACEPTIVE: ICD-10-CM

## 2023-02-06 PROBLEM — K21.9 GASTROESOPHAGEAL REFLUX DURING PREGNANCY, ANTEPARTUM: Status: RESOLVED | Noted: 2022-10-12 | Resolved: 2023-02-06

## 2023-02-06 PROBLEM — Z37.9 NORMAL LABOR: Status: RESOLVED | Noted: 2022-12-17 | Resolved: 2023-02-06

## 2023-02-06 PROBLEM — O99.619 GASTROESOPHAGEAL REFLUX DURING PREGNANCY, ANTEPARTUM: Status: RESOLVED | Noted: 2022-10-12 | Resolved: 2023-02-06

## 2023-02-06 PROBLEM — O98.819 CHLAMYDIA INFECTION DURING PREGNANCY: Status: RESOLVED | Noted: 2022-12-08 | Resolved: 2023-02-06

## 2023-02-06 PROBLEM — Z34.03 PRIMIGRAVIDA IN THIRD TRIMESTER: Status: RESOLVED | Noted: 2022-09-16 | Resolved: 2023-02-06

## 2023-02-06 PROBLEM — A74.9 CHLAMYDIA INFECTION DURING PREGNANCY: Status: RESOLVED | Noted: 2022-12-08 | Resolved: 2023-02-06

## 2023-02-06 PROCEDURE — 0503F POSTPARTUM CARE VISIT: CPT | Performed by: OBSTETRICS & GYNECOLOGY

## 2023-02-06 RX ORDER — ETONOGESTREL AND ETHINYL ESTRADIOL 11.7; 2.7 MG/1; MG/1
1 INSERT, EXTENDED RELEASE VAGINAL
Qty: 1 EACH | Refills: 12 | Status: SHIPPED | OUTPATIENT
Start: 2023-02-06 | End: 2024-02-06

## 2023-02-06 NOTE — PROGRESS NOTES
"[facility]  Obstetrics  Date of Service: 2023     CC: Postpartum visit      Teresa Catalan is a 22 y.o.  s/p Vaginal, Spontaneous on 2022 at 37w3d secondary to term labor who presents today for postpartum check.  The patient states she is doing well.  States mood is \"okay\" and still fluctuates.  Denies SI/HI.  Menstrual cycles have resumed.  She states that she tried to use a tampon towards the end of her period and states that it was painful removing.  Exclusively breastpumping; states that her nipples and breasts hurt all of the way to the back.  She has not resumed sexual intercourse.  Denies bowel or bladder issues.    PHYSICAL EXAM:    /70 (BP Location: Left arm, Patient Position: Sitting, Cuff Size: Adult)   Ht 170.2 cm (67\")   Wt 88 kg (194 lb)   LMP 2022 (Exact Date)   Breastfeeding Yes   BMI 30.38 kg/m²    Gen: NAD, AAO x3  Abd: Soft, NTND  Postpartum Depression Screening Questionnaire: 4    IMPRESSION/PLAN: Teresa Catalan is a 22 y.o.  s/p Vaginal, Spontaneous on .  Doing well.   - Recovered nicely from her delivery  - Restrictions lifted  - Will send message to Judi Gamble regarding Motherhood Connection for breastpumping support  - Discussed resources available for PPD including medications, counseling.  She declines.  Discussed that she can let us know at any point and we are here to support her.  - Contraception: NuvaRing  - RTC in May 2023 for annual w/ pap smear    This document has been electronically signed by Ramonita Delong MD on 2023 11:34 CST.  "

## 2023-02-07 ENCOUNTER — TELEPHONE (OUTPATIENT)
Dept: LABOR AND DELIVERY | Facility: HOSPITAL | Age: 23
End: 2023-02-07
Payer: COMMERCIAL

## 2023-02-14 ENCOUNTER — TELEPHONE (OUTPATIENT)
Dept: LABOR AND DELIVERY | Facility: HOSPITAL | Age: 23
End: 2023-02-14
Payer: COMMERCIAL

## 2023-02-14 NOTE — TELEPHONE ENCOUNTER
Reached out to follow up for lactation support from last weeks call.  Left message and MyChart note with return call information and additional resources.

## 2023-04-03 ENCOUNTER — OFFICE VISIT (OUTPATIENT)
Dept: OBSTETRICS AND GYNECOLOGY | Facility: CLINIC | Age: 23
End: 2023-04-03
Payer: COMMERCIAL

## 2023-04-03 ENCOUNTER — TELEPHONE (OUTPATIENT)
Dept: OBSTETRICS AND GYNECOLOGY | Facility: CLINIC | Age: 23
End: 2023-04-03

## 2023-04-03 DIAGNOSIS — N89.8 VAGINAL ODOR: ICD-10-CM

## 2023-04-03 DIAGNOSIS — N89.8 VAGINAL DISCHARGE: Primary | ICD-10-CM

## 2023-04-03 PROCEDURE — 87510 GARDNER VAG DNA DIR PROBE: CPT | Performed by: NURSE PRACTITIONER

## 2023-04-03 PROCEDURE — 87660 TRICHOMONAS VAGIN DIR PROBE: CPT | Performed by: NURSE PRACTITIONER

## 2023-04-03 PROCEDURE — 87480 CANDIDA DNA DIR PROBE: CPT | Performed by: NURSE PRACTITIONER

## 2023-04-03 NOTE — TELEPHONE ENCOUNTER
PATIENT CALLED AND THINKS SHE MAY HAVE RECURRING YEAST INFECTION. SHE IS WANTING TO DO A SWAB IF SOMEONE CAN CALL HER AND LET HER KNOW WHEN THE ORDERS ARE IN. SHE LIVES IN [place]. HER NUMBER -[phone].        THANKS,        MACK

## 2023-04-04 RX ORDER — METRONIDAZOLE 500 MG/1
500 TABLET ORAL 2 TIMES DAILY
Qty: 14 TABLET | Refills: 0 | Status: SHIPPED | OUTPATIENT
Start: 2023-04-04 | End: 2023-04-11

## 2023-04-26 ENCOUNTER — CLINICAL SUPPORT (OUTPATIENT)
Dept: OBSTETRICS AND GYNECOLOGY | Facility: CLINIC | Age: 23
End: 2023-04-26
Payer: COMMERCIAL

## 2023-04-26 DIAGNOSIS — N89.8 VAGINAL ODOR: ICD-10-CM

## 2023-04-26 DIAGNOSIS — N89.8 VAGINAL DISCHARGE: Primary | ICD-10-CM

## 2023-04-26 PROCEDURE — 87480 CANDIDA DNA DIR PROBE: CPT | Performed by: NURSE PRACTITIONER

## 2023-04-26 PROCEDURE — 87660 TRICHOMONAS VAGIN DIR PROBE: CPT | Performed by: NURSE PRACTITIONER

## 2023-04-26 PROCEDURE — 87510 GARDNER VAG DNA DIR PROBE: CPT | Performed by: NURSE PRACTITIONER

## 2023-04-27 LAB
CANDIDA ALBICANS: NEGATIVE
GARDNERELLA VAGINALIS: NEGATIVE
T VAGINALIS DNA VAG QL PROBE+SIG AMP: NEGATIVE

## 2023-05-02 RX ORDER — AMPICILLIN 500 MG/1
500 CAPSULE ORAL 2 TIMES DAILY
Qty: 14 CAPSULE | Refills: 0 | Status: SHIPPED | OUTPATIENT
Start: 2023-05-02

## 2023-05-02 RX ORDER — SACCHAROMYCES BOULARDII 250 MG
250 CAPSULE ORAL DAILY
Qty: 30 CAPSULE | Refills: 5 | Status: SHIPPED | OUTPATIENT
Start: 2023-05-02

## 2023-05-02 RX ORDER — METRONIDAZOLE 500 MG/1
500 TABLET ORAL 2 TIMES DAILY
Qty: 14 TABLET | Refills: 0 | Status: SHIPPED | OUTPATIENT
Start: 2023-05-02 | End: 2023-05-09

## 2023-05-02 RX ORDER — FLUCONAZOLE 150 MG/1
TABLET ORAL
Qty: 2 TABLET | Refills: 0 | Status: SHIPPED | OUTPATIENT
Start: 2023-05-02